# Patient Record
Sex: MALE | Race: WHITE | NOT HISPANIC OR LATINO | Employment: UNEMPLOYED | URBAN - METROPOLITAN AREA
[De-identification: names, ages, dates, MRNs, and addresses within clinical notes are randomized per-mention and may not be internally consistent; named-entity substitution may affect disease eponyms.]

---

## 2021-04-06 ENCOUNTER — LAB (OUTPATIENT)
Dept: LAB | Facility: HOSPITAL | Age: 56
End: 2021-04-06

## 2021-04-06 ENCOUNTER — OFFICE VISIT (OUTPATIENT)
Dept: NEUROLOGY | Facility: CLINIC | Age: 56
End: 2021-04-06

## 2021-04-06 VITALS
WEIGHT: 290 LBS | DIASTOLIC BLOOD PRESSURE: 78 MMHG | SYSTOLIC BLOOD PRESSURE: 142 MMHG | BODY MASS INDEX: 37.22 KG/M2 | HEIGHT: 74 IN | OXYGEN SATURATION: 98 % | HEART RATE: 68 BPM

## 2021-04-06 DIAGNOSIS — G70.00 MYASTHENIA GRAVIS WITHOUT EXACERBATION (HCC): ICD-10-CM

## 2021-04-06 DIAGNOSIS — G70.00 MYASTHENIA GRAVIS WITHOUT EXACERBATION (HCC): Primary | ICD-10-CM

## 2021-04-06 LAB
ALBUMIN SERPL-MCNC: 4.4 G/DL (ref 3.5–5.2)
ALBUMIN/GLOB SERPL: 1.5 G/DL
ALP SERPL-CCNC: 146 U/L (ref 39–117)
ALT SERPL W P-5'-P-CCNC: 45 U/L (ref 1–41)
ANION GAP SERPL CALCULATED.3IONS-SCNC: 10.6 MMOL/L (ref 5–15)
AST SERPL-CCNC: 34 U/L (ref 1–40)
BASOPHILS # BLD AUTO: 0.04 10*3/MM3 (ref 0–0.2)
BASOPHILS NFR BLD AUTO: 0.9 % (ref 0–1.5)
BILIRUB SERPL-MCNC: 0.5 MG/DL (ref 0–1.2)
BUN SERPL-MCNC: 25 MG/DL (ref 6–20)
BUN/CREAT SERPL: 27.2 (ref 7–25)
CALCIUM SPEC-SCNC: 8.8 MG/DL (ref 8.6–10.5)
CHLORIDE SERPL-SCNC: 104 MMOL/L (ref 98–107)
CO2 SERPL-SCNC: 22.4 MMOL/L (ref 22–29)
CREAT SERPL-MCNC: 0.92 MG/DL (ref 0.76–1.27)
DEPRECATED RDW RBC AUTO: 44.6 FL (ref 37–54)
EOSINOPHIL # BLD AUTO: 0.08 10*3/MM3 (ref 0–0.4)
EOSINOPHIL NFR BLD AUTO: 1.8 % (ref 0.3–6.2)
ERYTHROCYTE [DISTWIDTH] IN BLOOD BY AUTOMATED COUNT: 13.5 % (ref 12.3–15.4)
ERYTHROCYTE [SEDIMENTATION RATE] IN BLOOD: 10 MM/HR (ref 0–20)
GFR SERPL CREATININE-BSD FRML MDRD: 85 ML/MIN/1.73
GLOBULIN UR ELPH-MCNC: 3 GM/DL
GLUCOSE SERPL-MCNC: 86 MG/DL (ref 65–99)
HCT VFR BLD AUTO: 42.5 % (ref 37.5–51)
HGB BLD-MCNC: 14.1 G/DL (ref 13–17.7)
IMM GRANULOCYTES # BLD AUTO: 0.01 10*3/MM3 (ref 0–0.05)
IMM GRANULOCYTES NFR BLD AUTO: 0.2 % (ref 0–0.5)
LYMPHOCYTES # BLD AUTO: 1.43 10*3/MM3 (ref 0.7–3.1)
LYMPHOCYTES NFR BLD AUTO: 31.4 % (ref 19.6–45.3)
MCH RBC QN AUTO: 29.9 PG (ref 26.6–33)
MCHC RBC AUTO-ENTMCNC: 33.2 G/DL (ref 31.5–35.7)
MCV RBC AUTO: 90.2 FL (ref 79–97)
MONOCYTES # BLD AUTO: 0.32 10*3/MM3 (ref 0.1–0.9)
MONOCYTES NFR BLD AUTO: 7 % (ref 5–12)
NEUTROPHILS NFR BLD AUTO: 2.68 10*3/MM3 (ref 1.7–7)
NEUTROPHILS NFR BLD AUTO: 58.7 % (ref 42.7–76)
NRBC BLD AUTO-RTO: 0 /100 WBC (ref 0–0.2)
PLATELET # BLD AUTO: 279 10*3/MM3 (ref 140–450)
PMV BLD AUTO: 9.1 FL (ref 6–12)
POTASSIUM SERPL-SCNC: 4.4 MMOL/L (ref 3.5–5.2)
PROT SERPL-MCNC: 7.4 G/DL (ref 6–8.5)
RBC # BLD AUTO: 4.71 10*6/MM3 (ref 4.14–5.8)
SODIUM SERPL-SCNC: 137 MMOL/L (ref 136–145)
TSH SERPL DL<=0.05 MIU/L-ACNC: 2.94 UIU/ML (ref 0.27–4.2)
WBC # BLD AUTO: 4.56 10*3/MM3 (ref 3.4–10.8)

## 2021-04-06 PROCEDURE — 83519 RIA NONANTIBODY: CPT

## 2021-04-06 PROCEDURE — 82607 VITAMIN B-12: CPT

## 2021-04-06 PROCEDURE — 36415 COLL VENOUS BLD VENIPUNCTURE: CPT

## 2021-04-06 PROCEDURE — 80053 COMPREHEN METABOLIC PANEL: CPT

## 2021-04-06 PROCEDURE — 86255 FLUORESCENT ANTIBODY SCREEN: CPT

## 2021-04-06 PROCEDURE — 82746 ASSAY OF FOLIC ACID SERUM: CPT

## 2021-04-06 PROCEDURE — 86038 ANTINUCLEAR ANTIBODIES: CPT

## 2021-04-06 PROCEDURE — 99244 OFF/OP CNSLTJ NEW/EST MOD 40: CPT | Performed by: PSYCHIATRY & NEUROLOGY

## 2021-04-06 PROCEDURE — 85652 RBC SED RATE AUTOMATED: CPT

## 2021-04-06 PROCEDURE — 85025 COMPLETE CBC W/AUTO DIFF WBC: CPT

## 2021-04-06 PROCEDURE — 86431 RHEUMATOID FACTOR QUANT: CPT

## 2021-04-06 PROCEDURE — 84443 ASSAY THYROID STIM HORMONE: CPT

## 2021-04-06 RX ORDER — BUSPIRONE HYDROCHLORIDE 30 MG/1
30 TABLET ORAL 2 TIMES DAILY
COMMUNITY
Start: 2021-01-18

## 2021-04-06 RX ORDER — AZATHIOPRINE 50 MG/1
50 TABLET ORAL 2 TIMES DAILY
Qty: 60 TABLET | Refills: 5 | Status: SHIPPED | OUTPATIENT
Start: 2021-04-06 | End: 2021-05-06 | Stop reason: SDUPTHER

## 2021-04-06 RX ORDER — PAROXETINE HYDROCHLORIDE 40 MG/1
40 TABLET, FILM COATED ORAL DAILY
COMMUNITY
Start: 2021-03-25

## 2021-04-06 RX ORDER — HYOSCYAMINE SULFATE EXTENDED-RELEASE 0.38 MG/1
TABLET ORAL
COMMUNITY
Start: 2021-01-29 | End: 2022-06-30

## 2021-04-06 RX ORDER — PREDNISONE 20 MG/1
40 TABLET ORAL DAILY
Qty: 60 TABLET | Refills: 11 | Status: SHIPPED | OUTPATIENT
Start: 2021-04-06 | End: 2021-11-23

## 2021-04-06 RX ORDER — DICLOFENAC SODIUM 75 MG/1
TABLET, DELAYED RELEASE ORAL
COMMUNITY
End: 2022-09-14 | Stop reason: HOSPADM

## 2021-04-06 RX ORDER — VALACYCLOVIR HYDROCHLORIDE 1 G/1
1000 TABLET, FILM COATED ORAL AS NEEDED
COMMUNITY
Start: 2021-02-03

## 2021-04-06 RX ORDER — PYRIDOSTIGMINE BROMIDE 60 MG/1
TABLET ORAL
COMMUNITY
Start: 2020-11-23 | End: 2021-11-23

## 2021-04-06 RX ORDER — PANTOPRAZOLE SODIUM 40 MG/1
40 TABLET, DELAYED RELEASE ORAL DAILY
COMMUNITY
Start: 2021-03-29 | End: 2022-09-14 | Stop reason: HOSPADM

## 2021-04-06 NOTE — PROGRESS NOTES
"Chief Complaint  MG (NP)    Subjective          Jaiden Burdick presents to [facility] NEUROLOGY for MG.    History of Present Illness    55 y.o. male referred by Dr Boyce.  3 - 4 months ago developed L ptosis, horizontal diplopia.  Worse in the evenings, driving and watching TV.     Denies trouble chewing/swallowing.      Arms are weak.  Trouble arising from a chair.    Heat makes him weak.      MG ADL 11    CT Chest normal     Reviewed medical records:    C/O HA, vision loss, diplopia    Dx with MG by elevated Ach R ab blocking 45, binding     Started on mestinon with GI upset.     MRI Brain, 11/2/2020, mild SVDz  CTA Head, 11/2/2020, mild irregularity of vessels.   Objective   Vital Signs:   /78   Pulse 68   Ht 188 cm (74\")   Wt 132 kg (290 lb)   SpO2 98%   BMI 37.23 kg/m²     Physical Exam  Eyes:      Extraocular Movements: EOM normal.      Pupils: Pupils are equal, round, and reactive to light.   Neurological:      Mental Status: He is oriented to person, place, and time.      Coordination: Finger-Nose-Finger Test, Heel to Shin Test and Romberg Test normal.      Gait: Gait is intact. Tandem walk normal.      Deep Tendon Reflexes: Strength normal.   Psychiatric:         Speech: Speech normal.          Neurologic Exam     Mental Status   Oriented to person, place, and time.   Registration: recalls 3 of 3 objects. Recall at 5 minutes: recalls 3 of 3 objects. Follows 3 step commands.   Attention: normal. Concentration: normal.   Speech: speech is normal   Level of consciousness: alert  Knowledge: good and consistent with education.   Able to name object. Able to read. Able to repeat. Able to write. Normal comprehension.     Cranial Nerves     CN II   Visual fields full to confrontation.   Visual acuity: normal  Right visual field deficit: none  Left visual field deficit: none     CN III, IV, VI   Pupils are equal, round, and reactive to light.  Extraocular motions are normal. "   Nystagmus: none   Diplopia: bilateral  Ophthalmoparesis: none  Upgaze: normal  Downgaze: normal  Conjugate gaze: present  Vestibulo-ocular reflex: present    CN V   Facial sensation intact.   Right corneal reflex: normal  Left corneal reflex: normal    CN VII   Right facial weakness: none  Left facial weakness: none    CN VIII   Hearing: intact    CN IX, X   Palate: symmetric  Right gag reflex: normal  Left gag reflex: normal    CN XI   Right sternocleidomastoid strength: normal  Left sternocleidomastoid strength: normal    CN XII   Tongue: not atrophic  Fasciculations: absent  Tongue deviation: none  Left ptosis        Motor Exam   Muscle bulk: normal  Overall muscle tone: normal  Right arm tone: normal  Left arm tone: normal  Right leg tone: normal  Left leg tone: normal    Strength   Strength 5/5 throughout.     Sensory Exam   Light touch normal.   Pinprick normal.     Gait, Coordination, and Reflexes     Gait  Gait: normal    Coordination   Romberg: negative  Finger to nose coordination: normal  Heel to shin coordination: normal  Tandem walking coordination: normal    Tremor   Resting tremor: absent  Intention tremor: absent  Action tremor: absent    Reflexes   Reflexes 2+ except as noted.          Result Review :   The following data was reviewed by: Franco Garza MD on 04/06/2021:      Data reviewed: Radiologic studies MRI Brain, CTA Head and Consultant notes Dr Boyce          Assessment and Plan    Diagnoses and all orders for this visit:    1. Myasthenia gravis without exacerbation (CMS/HCC) (Primary)  Assessment & Plan:  MG ADL 11    Start Prednisone 40 mg daily and Imuran 50 mg BID    Labs     Orders:  -     Myasthenia Gravis Full Panel; Future  -     Musk Antibody; Future  -     CBC & Differential; Future  -     Comprehensive Metabolic Panel; Future  -     GANESH by IFA, Reflex 9-biomarkers profile; Future  -     Sedimentation Rate; Future  -     Rheumatoid Factor; Future  -     TSH; Future  -      Vitamin B12 & Folate; Future    Other orders  -     predniSONE (DELTASONE) 20 MG tablet; Take 2 tablets by mouth Daily.  Dispense: 60 tablet; Refill: 11  -     azaTHIOprine (IMURAN) 50 MG tablet; Take 1 tablet by mouth 2 (two) times a day.  Dispense: 60 tablet; Refill: 5      Follow Up   No follow-ups on file.  Patient was given instructions and counseling regarding his condition or for health maintenance advice. Please see specific information pulled into the AVS if appropriate.

## 2021-04-07 LAB
CHROMATIN AB SERPL-ACNC: <10 IU/ML (ref 0–14)
FOLATE SERPL-MCNC: 8.54 NG/ML (ref 4.78–24.2)
VIT B12 BLD-MCNC: 528 PG/ML (ref 211–946)

## 2021-04-08 LAB
ANA TITR SER IF: NEGATIVE {TITER}
LABORATORY COMMENT REPORT: NORMAL

## 2021-04-14 LAB — MUSK AB SER-SCNC: <1 U/ML

## 2021-04-15 LAB
ACHR BIND AB SER-SCNC: 7.4 NMOL/L (ref 0–0.24)
ACHR BLOCK AB SER-ACNC: 44 % (ref 0–25)
ACHR MOD AB/ACHR TOTAL SFR SER: 37 % (ref 0–20)
REFLEX INFORMATION: ABNORMAL
STRIA MUS AB TITR SER IF: NEGATIVE {TITER}

## 2021-05-06 ENCOUNTER — LAB (OUTPATIENT)
Dept: LAB | Facility: HOSPITAL | Age: 56
End: 2021-05-06

## 2021-05-06 ENCOUNTER — OFFICE VISIT (OUTPATIENT)
Dept: NEUROLOGY | Facility: CLINIC | Age: 56
End: 2021-05-06

## 2021-05-06 VITALS
TEMPERATURE: 97.5 F | DIASTOLIC BLOOD PRESSURE: 76 MMHG | WEIGHT: 287 LBS | OXYGEN SATURATION: 98 % | BODY MASS INDEX: 36.83 KG/M2 | HEART RATE: 80 BPM | SYSTOLIC BLOOD PRESSURE: 136 MMHG | HEIGHT: 74 IN

## 2021-05-06 DIAGNOSIS — G70.00 MYASTHENIA GRAVIS WITHOUT EXACERBATION (HCC): Primary | Chronic | ICD-10-CM

## 2021-05-06 DIAGNOSIS — G70.00 MYASTHENIA GRAVIS WITHOUT EXACERBATION (HCC): ICD-10-CM

## 2021-05-06 PROCEDURE — 36415 COLL VENOUS BLD VENIPUNCTURE: CPT

## 2021-05-06 PROCEDURE — 80053 COMPREHEN METABOLIC PANEL: CPT

## 2021-05-06 PROCEDURE — 85025 COMPLETE CBC W/AUTO DIFF WBC: CPT

## 2021-05-06 PROCEDURE — 99214 OFFICE O/P EST MOD 30 MIN: CPT | Performed by: PSYCHIATRY & NEUROLOGY

## 2021-05-06 RX ORDER — AZATHIOPRINE 50 MG/1
100 TABLET ORAL 2 TIMES DAILY
Qty: 120 TABLET | Refills: 5 | Status: SHIPPED | OUTPATIENT
Start: 2021-05-06 | End: 2021-07-29 | Stop reason: SDUPTHER

## 2021-05-06 NOTE — PROGRESS NOTES
"Chief Complaint  MG    Subjective          Jaiden Burdick presents to [facility] NEUROLOGY for   History of Present Illness    55 y.o. male returns in follow up.  Last visit on 4/6/21 started prednisone 40 mg daily, AZA, mestinon, ordered labs.     AChR Ab binding 7.4; Blocking 44; modulating 37  MUSK negative    MG ADL 11    Eyelids feel heavy.  Intermittent diplopia.      3 - 4 months ago developed L ptosis, horizontal diplopia.  Worse in the evenings, driving and watching TV.      Mild trouble chewing/swallowing.       Arms are weak.  Trouble arising from a chair.     Feels 40% improvement since starting on medications.          CT Chest normal      Reviewed medical records:     C/O HA, vision loss, diplopia     Dx with MG by elevated Ach R ab blocking 45, binding      Started on mestinon with GI upset.      MRI Brain, 11/2/2020, mild SVDz  CTA Head, 11/2/2020, mild irregularity of vessels.        Objective   Vital Signs:   /76   Pulse 80   Temp 97.5 °F (36.4 °C)   Ht 188 cm (74.02\")   Wt 130 kg (287 lb)   SpO2 98%   BMI 36.83 kg/m²     Physical Exam  Eyes:      Extraocular Movements: EOM normal.      Pupils: Pupils are equal, round, and reactive to light.   Neurological:      Mental Status: He is oriented to person, place, and time.      Coordination: Finger-Nose-Finger Test, Heel to Shin Test and Romberg Test normal.      Gait: Gait is intact. Tandem walk normal.   Psychiatric:         Speech: Speech normal.          Neurologic Exam     Mental Status   Oriented to person, place, and time.   Registration: recalls 3 of 3 objects. Recall at 5 minutes: recalls 3 of 3 objects. Follows 3 step commands.   Attention: normal. Concentration: normal.   Speech: speech is normal   Level of consciousness: alert  Knowledge: good and consistent with education.   Able to name object. Able to read. Able to repeat. Able to write. Normal comprehension.     Cranial Nerves     CN II   Visual fields full " to confrontation.   Visual acuity: normal  Right visual field deficit: none  Left visual field deficit: none     CN III, IV, VI   Pupils are equal, round, and reactive to light.  Extraocular motions are normal.   Nystagmus: none   Diplopia: bilateral  Ophthalmoparesis: none  Upgaze: normal  Downgaze: normal  Conjugate gaze: present  Vestibulo-ocular reflex: present    CN V   Facial sensation intact.   Right corneal reflex: normal  Left corneal reflex: normal    CN VII   Right facial weakness: none  Left facial weakness: none    CN VIII   Hearing: intact    CN IX, X   Palate: symmetric  Right gag reflex: normal  Left gag reflex: normal    CN XI   Right sternocleidomastoid strength: normal  Left sternocleidomastoid strength: normal    CN XII   Tongue: not atrophic  Fasciculations: absent  Tongue deviation: none  Left ptosis        Motor Exam   Muscle bulk: normal  Overall muscle tone: normal  Right arm tone: normal  Left arm tone: normal  Right leg tone: normal  Left leg tone: normal    Strength   Strength 5/5 except as noted.   Right deltoid: 4/5  Left deltoid: 4/5  Right biceps: 4/5  Left biceps: 4/5  Right iliopsoas: 4/5  Left iliopsoas: 4/5  Right quadriceps: 4/5  Left quadriceps: 4/5    Sensory Exam   Light touch normal.   Pinprick normal.     Gait, Coordination, and Reflexes     Gait  Gait: normal    Coordination   Romberg: negative  Finger to nose coordination: normal  Heel to shin coordination: normal  Tandem walking coordination: normal    Tremor   Resting tremor: absent  Intention tremor: absent  Action tremor: absent    Reflexes   Reflexes 2+ except as noted.      Result Review :   The following data was reviewed by: Franco Garza MD on 05/06/2021:  Common labs    Common Labsle 4/6/21 4/6/21    1048 1048   Glucose  86   BUN  25 (A)   Creatinine  0.92   eGFR Non African Am  85   Sodium  137   Potassium  4.4   Chloride  104   Calcium  8.8   Albumin  4.40   Total Bilirubin  0.5   Alkaline Phosphatase  146  (A)   AST (SGOT)  34   ALT (SGPT)  45 (A)   WBC 4.56    Hemoglobin 14.1    Hematocrit 42.5    Platelets 279    (A) Abnormal value                      Assessment and Plan    Diagnoses and all orders for this visit:    1. Myasthenia gravis without exacerbation (CMS/HCC) (Primary)  Assessment & Plan:  Sx improved on prednisone, AZA, mestinon    Increase  mg BID     Orders:  -     CBC & Differential; Future  -     Comprehensive Metabolic Panel; Future    Other orders  -     azaTHIOprine (IMURAN) 50 MG tablet; Take 2 tablets by mouth 2 (two) times a day.  Dispense: 120 tablet; Refill: 5      Follow Up   No follow-ups on file.  Patient was given instructions and counseling regarding his condition or for health maintenance advice. Please see specific information pulled into the AVS if appropriate.

## 2021-05-07 LAB
ALBUMIN SERPL-MCNC: 4.1 G/DL (ref 3.5–5.2)
ALBUMIN/GLOB SERPL: 1.5 G/DL
ALP SERPL-CCNC: 75 U/L (ref 39–117)
ALT SERPL W P-5'-P-CCNC: 28 U/L (ref 1–41)
ANION GAP SERPL CALCULATED.3IONS-SCNC: 12 MMOL/L (ref 5–15)
AST SERPL-CCNC: 22 U/L (ref 1–40)
BASOPHILS # BLD AUTO: 0.03 10*3/MM3 (ref 0–0.2)
BASOPHILS NFR BLD AUTO: 0.3 % (ref 0–1.5)
BILIRUB SERPL-MCNC: 0.7 MG/DL (ref 0–1.2)
BUN SERPL-MCNC: 32 MG/DL (ref 6–20)
BUN/CREAT SERPL: 28.8 (ref 7–25)
CALCIUM SPEC-SCNC: 9.1 MG/DL (ref 8.6–10.5)
CHLORIDE SERPL-SCNC: 104 MMOL/L (ref 98–107)
CO2 SERPL-SCNC: 25 MMOL/L (ref 22–29)
CREAT SERPL-MCNC: 1.11 MG/DL (ref 0.76–1.27)
DEPRECATED RDW RBC AUTO: 47.9 FL (ref 37–54)
EOSINOPHIL # BLD AUTO: 0.03 10*3/MM3 (ref 0–0.4)
EOSINOPHIL NFR BLD AUTO: 0.3 % (ref 0.3–6.2)
ERYTHROCYTE [DISTWIDTH] IN BLOOD BY AUTOMATED COUNT: 14.2 % (ref 12.3–15.4)
GFR SERPL CREATININE-BSD FRML MDRD: 69 ML/MIN/1.73
GLOBULIN UR ELPH-MCNC: 2.8 GM/DL
GLUCOSE SERPL-MCNC: 84 MG/DL (ref 65–99)
HCT VFR BLD AUTO: 41.8 % (ref 37.5–51)
HGB BLD-MCNC: 13.6 G/DL (ref 13–17.7)
IMM GRANULOCYTES # BLD AUTO: 0.04 10*3/MM3 (ref 0–0.05)
IMM GRANULOCYTES NFR BLD AUTO: 0.4 % (ref 0–0.5)
LYMPHOCYTES # BLD AUTO: 2.03 10*3/MM3 (ref 0.7–3.1)
LYMPHOCYTES NFR BLD AUTO: 18.6 % (ref 19.6–45.3)
MCH RBC QN AUTO: 29.5 PG (ref 26.6–33)
MCHC RBC AUTO-ENTMCNC: 32.5 G/DL (ref 31.5–35.7)
MCV RBC AUTO: 90.7 FL (ref 79–97)
MONOCYTES # BLD AUTO: 0.81 10*3/MM3 (ref 0.1–0.9)
MONOCYTES NFR BLD AUTO: 7.4 % (ref 5–12)
NEUTROPHILS NFR BLD AUTO: 7.97 10*3/MM3 (ref 1.7–7)
NEUTROPHILS NFR BLD AUTO: 73 % (ref 42.7–76)
NRBC BLD AUTO-RTO: 0 /100 WBC (ref 0–0.2)
PLATELET # BLD AUTO: 331 10*3/MM3 (ref 140–450)
PMV BLD AUTO: 8.9 FL (ref 6–12)
POTASSIUM SERPL-SCNC: 4.6 MMOL/L (ref 3.5–5.2)
PROT SERPL-MCNC: 6.9 G/DL (ref 6–8.5)
RBC # BLD AUTO: 4.61 10*6/MM3 (ref 4.14–5.8)
SODIUM SERPL-SCNC: 141 MMOL/L (ref 136–145)
WBC # BLD AUTO: 10.91 10*3/MM3 (ref 3.4–10.8)

## 2021-06-03 ENCOUNTER — LAB (OUTPATIENT)
Dept: LAB | Facility: HOSPITAL | Age: 56
End: 2021-06-03

## 2021-06-03 ENCOUNTER — OFFICE VISIT (OUTPATIENT)
Dept: NEUROLOGY | Facility: CLINIC | Age: 56
End: 2021-06-03

## 2021-06-03 VITALS
DIASTOLIC BLOOD PRESSURE: 80 MMHG | SYSTOLIC BLOOD PRESSURE: 140 MMHG | HEIGHT: 74 IN | WEIGHT: 284 LBS | OXYGEN SATURATION: 97 % | BODY MASS INDEX: 36.45 KG/M2 | HEART RATE: 87 BPM

## 2021-06-03 DIAGNOSIS — G70.00 MYASTHENIA GRAVIS WITHOUT EXACERBATION (HCC): ICD-10-CM

## 2021-06-03 DIAGNOSIS — G70.00 MYASTHENIA GRAVIS WITHOUT EXACERBATION (HCC): Primary | Chronic | ICD-10-CM

## 2021-06-03 PROCEDURE — 85025 COMPLETE CBC W/AUTO DIFF WBC: CPT

## 2021-06-03 PROCEDURE — 80053 COMPREHEN METABOLIC PANEL: CPT

## 2021-06-03 PROCEDURE — 99214 OFFICE O/P EST MOD 30 MIN: CPT | Performed by: PSYCHIATRY & NEUROLOGY

## 2021-06-03 PROCEDURE — 36415 COLL VENOUS BLD VENIPUNCTURE: CPT

## 2021-06-03 NOTE — ASSESSMENT & PLAN NOTE
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

## 2021-06-03 NOTE — PROGRESS NOTES
"Chief Complaint  No chief complaint on file.    Subjective          Jaiden Burdick presents to [facility] NEUROLOGY     History of Present Illness    55 y.o. male returns in follow up.  Last visit on 5/6/21 prednisone 40 mg daily, increased AZA, mestinon, ordered labs.      AChR Ab binding 7.4; Blocking 44; modulating 37  MUSK negative     MG ADL 7 improved from 11    No longer on mestinon    Noticing sharp pains throat and abdomen in am not provoked by exertion.         5/6/21 CBC.CMP - NCS     Problem history:    Eyelids feel heavy.  Intermittent diplopia.       3 - 4 months ago developed L ptosis, horizontal diplopia.  Worse in the evenings, driving and watching TV.      Mild trouble chewing/swallowing.       Arms are weak.  Trouble arising from a chair.     Feels 40% improvement since starting on medications.           CT Chest normal      Reviewed medical records:     C/O HA, vision loss, diplopia     Dx with MG by elevated Ach R ab blocking 45, binding      Started on mestinon with GI upset.      MRI Brain, 11/2/2020, mild SVDz  CTA Head, 11/2/2020, mild irregularity of vessels.           Objective   Vital Signs:   /80   Pulse 87   Ht 188 cm (74.02\")   Wt 129 kg (284 lb)   SpO2 97%   BMI 36.45 kg/m²     Physical Exam  Eyes:      Extraocular Movements: EOM normal.      Pupils: Pupils are equal, round, and reactive to light.   Neurological:      Mental Status: He is oriented to person, place, and time.      Coordination: Finger-Nose-Finger Test normal.      Gait: Gait is intact.      Deep Tendon Reflexes: Strength normal.   Psychiatric:         Speech: Speech normal.          Neurologic Exam     Mental Status   Oriented to person, place, and time.   Attention: normal. Concentration: normal.   Speech: speech is normal   Level of consciousness: alert  Knowledge: good and consistent with education.   Normal comprehension.     Cranial Nerves     CN II   Visual fields full to " confrontation.   Visual acuity: normal  Right visual field deficit: none  Left visual field deficit: none     CN III, IV, VI   Pupils are equal, round, and reactive to light.  Extraocular motions are normal.   Nystagmus: none   Diplopia: none  Ophthalmoparesis: none  Upgaze: normal  Downgaze: normal  Conjugate gaze: present    CN V   Facial sensation intact.   Right corneal reflex: normal  Left corneal reflex: normal    CN VII   Right facial weakness: none  Left facial weakness: none    CN VIII   Hearing: intact    CN IX, X   Palate: symmetric  Right gag reflex: normal  Left gag reflex: normal    CN XI   Right sternocleidomastoid strength: normal  Left sternocleidomastoid strength: normal    CN XII   Tongue: not atrophic  Fasciculations: absent  Tongue deviation: none    Motor Exam   Muscle bulk: normal  Overall muscle tone: normal  Right arm tone: normal  Left arm tone: normal  Right leg tone: normal  Left leg tone: normal    Strength   Strength 5/5 throughout.     Sensory Exam   Light touch normal.     Gait, Coordination, and Reflexes     Gait  Gait: normal    Coordination   Finger to nose coordination: normal    Tremor   Resting tremor: absent  Intention tremor: absent  Action tremor: absent    Reflexes   Reflexes 2+ except as noted.      Result Review :   The following data was reviewed by: Franco Garza MD on 06/03/2021:  Common labs    Common Labsle 4/6/21 4/6/21 5/6/21 5/6/21    1048 1048 1600 1600   Glucose  86  84   BUN  25 (A)  32 (A)   Creatinine  0.92  1.11   eGFR Non African Am  85  69   Sodium  137  141   Potassium  4.4  4.6   Chloride  104  104   Calcium  8.8  9.1   Albumin  4.40  4.10   Total Bilirubin  0.5  0.7   Alkaline Phosphatase  146 (A)  75   AST (SGOT)  34  22   ALT (SGPT)  45 (A)  28   WBC 4.56  10.91 (A)    Hemoglobin 14.1  13.6    Hematocrit 42.5  41.8    Platelets 279  331    (A) Abnormal value       Comments are available for some flowsheets but are not being displayed.                       Assessment and Plan    Diagnoses and all orders for this visit:    1. 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  Patient was given instructions and counseling regarding his condition or for health maintenance advice. Please see specific information pulled into the AVS if appropriate.

## 2021-06-04 LAB
ALBUMIN SERPL-MCNC: 4.6 G/DL (ref 3.5–5.2)
ALBUMIN/GLOB SERPL: 1.7 G/DL
ALP SERPL-CCNC: 63 U/L (ref 39–117)
ALT SERPL W P-5'-P-CCNC: 23 U/L (ref 1–41)
ANION GAP SERPL CALCULATED.3IONS-SCNC: 6.3 MMOL/L (ref 5–15)
AST SERPL-CCNC: 17 U/L (ref 1–40)
BASOPHILS # BLD AUTO: 0.02 10*3/MM3 (ref 0–0.2)
BASOPHILS NFR BLD AUTO: 0.3 % (ref 0–1.5)
BILIRUB SERPL-MCNC: 0.5 MG/DL (ref 0–1.2)
BUN SERPL-MCNC: 24 MG/DL (ref 6–20)
BUN/CREAT SERPL: 23.5 (ref 7–25)
CALCIUM SPEC-SCNC: 9.5 MG/DL (ref 8.6–10.5)
CHLORIDE SERPL-SCNC: 102 MMOL/L (ref 98–107)
CO2 SERPL-SCNC: 28.7 MMOL/L (ref 22–29)
CREAT SERPL-MCNC: 1.02 MG/DL (ref 0.76–1.27)
DEPRECATED RDW RBC AUTO: 47.6 FL (ref 37–54)
EOSINOPHIL # BLD AUTO: 0 10*3/MM3 (ref 0–0.4)
EOSINOPHIL NFR BLD AUTO: 0 % (ref 0.3–6.2)
ERYTHROCYTE [DISTWIDTH] IN BLOOD BY AUTOMATED COUNT: 14.1 % (ref 12.3–15.4)
GFR SERPL CREATININE-BSD FRML MDRD: 76 ML/MIN/1.73
GLOBULIN UR ELPH-MCNC: 2.7 GM/DL
GLUCOSE SERPL-MCNC: 113 MG/DL (ref 65–99)
HCT VFR BLD AUTO: 45.6 % (ref 37.5–51)
HGB BLD-MCNC: 15.1 G/DL (ref 13–17.7)
IMM GRANULOCYTES # BLD AUTO: 0.02 10*3/MM3 (ref 0–0.05)
IMM GRANULOCYTES NFR BLD AUTO: 0.3 % (ref 0–0.5)
LYMPHOCYTES # BLD AUTO: 0.78 10*3/MM3 (ref 0.7–3.1)
LYMPHOCYTES NFR BLD AUTO: 10.7 % (ref 19.6–45.3)
MCH RBC QN AUTO: 30.2 PG (ref 26.6–33)
MCHC RBC AUTO-ENTMCNC: 33.1 G/DL (ref 31.5–35.7)
MCV RBC AUTO: 91.2 FL (ref 79–97)
MONOCYTES # BLD AUTO: 0.26 10*3/MM3 (ref 0.1–0.9)
MONOCYTES NFR BLD AUTO: 3.6 % (ref 5–12)
NEUTROPHILS NFR BLD AUTO: 6.18 10*3/MM3 (ref 1.7–7)
NEUTROPHILS NFR BLD AUTO: 85.1 % (ref 42.7–76)
NRBC BLD AUTO-RTO: 0 /100 WBC (ref 0–0.2)
PLATELET # BLD AUTO: 310 10*3/MM3 (ref 140–450)
PMV BLD AUTO: 8.7 FL (ref 6–12)
POTASSIUM SERPL-SCNC: 4.6 MMOL/L (ref 3.5–5.2)
PROT SERPL-MCNC: 7.3 G/DL (ref 6–8.5)
RBC # BLD AUTO: 5 10*6/MM3 (ref 4.14–5.8)
SODIUM SERPL-SCNC: 137 MMOL/L (ref 136–145)
WBC # BLD AUTO: 7.26 10*3/MM3 (ref 3.4–10.8)

## 2021-07-29 RX ORDER — AZATHIOPRINE 50 MG/1
100 TABLET ORAL 2 TIMES DAILY
Qty: 360 TABLET | Refills: 2 | Status: SHIPPED | OUTPATIENT
Start: 2021-07-29 | End: 2022-05-09

## 2021-08-09 ENCOUNTER — TELEPHONE (OUTPATIENT)
Dept: NEUROLOGY | Facility: OTHER | Age: 56
End: 2021-08-09

## 2021-08-09 NOTE — TELEPHONE ENCOUNTER
A lady called to confirm [facility] apt. However there was not verbal auth in chart. I told her she could have him call us to give out details about his appt but it would have to be him that we speak to.

## 2021-09-01 ENCOUNTER — TELEPHONE (OUTPATIENT)
Dept: NEUROLOGY | Facility: CLINIC | Age: 56
End: 2021-09-01

## 2021-09-01 NOTE — TELEPHONE ENCOUNTER
Provider: DR GRIMALDO    Caller: LINDSEY    Relationship to Patient: GIRLFRIEND    Reason for Call: LINDSEY IS LETTING DR GRIMALDO KNOW THAT NAE IS BEING ADMITTED TO THE HOSPITAL FOR COVID AND PNEUMONIA.  [facility]

## 2021-11-23 ENCOUNTER — LAB (OUTPATIENT)
Dept: LAB | Facility: HOSPITAL | Age: 56
End: 2021-11-23

## 2021-11-23 ENCOUNTER — OFFICE VISIT (OUTPATIENT)
Dept: NEUROLOGY | Facility: CLINIC | Age: 56
End: 2021-11-23

## 2021-11-23 VITALS
DIASTOLIC BLOOD PRESSURE: 78 MMHG | TEMPERATURE: 97.1 F | OXYGEN SATURATION: 98 % | SYSTOLIC BLOOD PRESSURE: 134 MMHG | HEIGHT: 74 IN | HEART RATE: 88 BPM | BODY MASS INDEX: 36.45 KG/M2 | WEIGHT: 284 LBS

## 2021-11-23 DIAGNOSIS — K21.9 GASTROESOPHAGEAL REFLUX DISEASE WITHOUT ESOPHAGITIS: ICD-10-CM

## 2021-11-23 DIAGNOSIS — G70.00 MYASTHENIA GRAVIS WITHOUT EXACERBATION (HCC): Primary | Chronic | ICD-10-CM

## 2021-11-23 DIAGNOSIS — G70.00 MYASTHENIA GRAVIS WITHOUT EXACERBATION (HCC): ICD-10-CM

## 2021-11-23 LAB
ALBUMIN SERPL-MCNC: 4 G/DL (ref 3.5–5.2)
ALBUMIN/GLOB SERPL: 1.4 G/DL
ALP SERPL-CCNC: 101 U/L (ref 39–117)
ALT SERPL W P-5'-P-CCNC: 32 U/L (ref 1–41)
ANION GAP SERPL CALCULATED.3IONS-SCNC: 10 MMOL/L (ref 5–15)
AST SERPL-CCNC: 30 U/L (ref 1–40)
BASOPHILS # BLD AUTO: 0.05 10*3/MM3 (ref 0–0.2)
BASOPHILS NFR BLD AUTO: 0.5 % (ref 0–1.5)
BILIRUB SERPL-MCNC: 0.4 MG/DL (ref 0–1.2)
BUN SERPL-MCNC: 28 MG/DL (ref 6–20)
BUN/CREAT SERPL: 23.3 (ref 7–25)
CALCIUM SPEC-SCNC: 9.4 MG/DL (ref 8.6–10.5)
CHLORIDE SERPL-SCNC: 100 MMOL/L (ref 98–107)
CO2 SERPL-SCNC: 27 MMOL/L (ref 22–29)
CREAT SERPL-MCNC: 1.2 MG/DL (ref 0.76–1.27)
DEPRECATED RDW RBC AUTO: 47.9 FL (ref 37–54)
EOSINOPHIL # BLD AUTO: 0.05 10*3/MM3 (ref 0–0.4)
EOSINOPHIL NFR BLD AUTO: 0.5 % (ref 0.3–6.2)
ERYTHROCYTE [DISTWIDTH] IN BLOOD BY AUTOMATED COUNT: 13.9 % (ref 12.3–15.4)
GFR SERPL CREATININE-BSD FRML MDRD: 62 ML/MIN/1.73
GLOBULIN UR ELPH-MCNC: 2.9 GM/DL
GLUCOSE SERPL-MCNC: 93 MG/DL (ref 65–99)
HCT VFR BLD AUTO: 43.6 % (ref 37.5–51)
HGB BLD-MCNC: 14.3 G/DL (ref 13–17.7)
IMM GRANULOCYTES # BLD AUTO: 0.06 10*3/MM3 (ref 0–0.05)
IMM GRANULOCYTES NFR BLD AUTO: 0.7 % (ref 0–0.5)
LYMPHOCYTES # BLD AUTO: 2.49 10*3/MM3 (ref 0.7–3.1)
LYMPHOCYTES NFR BLD AUTO: 27.2 % (ref 19.6–45.3)
MCH RBC QN AUTO: 30.9 PG (ref 26.6–33)
MCHC RBC AUTO-ENTMCNC: 32.8 G/DL (ref 31.5–35.7)
MCV RBC AUTO: 94.2 FL (ref 79–97)
MONOCYTES # BLD AUTO: 0.72 10*3/MM3 (ref 0.1–0.9)
MONOCYTES NFR BLD AUTO: 7.9 % (ref 5–12)
NEUTROPHILS NFR BLD AUTO: 5.77 10*3/MM3 (ref 1.7–7)
NEUTROPHILS NFR BLD AUTO: 63.2 % (ref 42.7–76)
NRBC BLD AUTO-RTO: 0 /100 WBC (ref 0–0.2)
PLATELET # BLD AUTO: 346 10*3/MM3 (ref 140–450)
PMV BLD AUTO: 8.4 FL (ref 6–12)
POTASSIUM SERPL-SCNC: 4.4 MMOL/L (ref 3.5–5.2)
PROT SERPL-MCNC: 6.9 G/DL (ref 6–8.5)
RBC # BLD AUTO: 4.63 10*6/MM3 (ref 4.14–5.8)
SODIUM SERPL-SCNC: 137 MMOL/L (ref 136–145)
WBC NRBC COR # BLD: 9.14 10*3/MM3 (ref 3.4–10.8)

## 2021-11-23 PROCEDURE — 36415 COLL VENOUS BLD VENIPUNCTURE: CPT

## 2021-11-23 PROCEDURE — 85025 COMPLETE CBC W/AUTO DIFF WBC: CPT

## 2021-11-23 PROCEDURE — 99214 OFFICE O/P EST MOD 30 MIN: CPT | Performed by: PSYCHIATRY & NEUROLOGY

## 2021-11-23 PROCEDURE — 80053 COMPREHEN METABOLIC PANEL: CPT

## 2021-11-23 RX ORDER — PREDNISONE 20 MG/1
20 TABLET ORAL DAILY
Qty: 60 TABLET | Refills: 11 | Status: SHIPPED | OUTPATIENT
Start: 2021-11-23 | End: 2022-04-05

## 2021-11-23 RX ORDER — ALBUTEROL SULFATE 90 UG/1
AEROSOL, METERED RESPIRATORY (INHALATION)
COMMUNITY
Start: 2021-10-03 | End: 2022-08-15

## 2021-12-14 ENCOUNTER — TELEPHONE (OUTPATIENT)
Dept: NEUROLOGY | Facility: CLINIC | Age: 56
End: 2021-12-14

## 2021-12-14 NOTE — TELEPHONE ENCOUNTER
Caller: LINDSEY ADAN    Relationship: Emergency Contact; FRIEND    Best call back number: [phone]    What form or medical record are you requesting: LAST OV NOTE 11/23/21 & LAB RESULTS    Who is requesting this form or medical record from you: NEW PCP OFFICE, DR. LINDSEY RODRIGUEZ    How would you like to receive the form or medical records (pick-up, mail, fax): FAX  If fax, what is the fax number: [phone]    Timeframe paperwork needed: EARLIEST CONVENIENCE    Additional notes: PT'S FRIEND CALLED ON HIS BEHALF TO NOTE THAT PT IS SWITCHING PCP'S TO SOMEONE CLOSER TO HIS PLACE OF WORK. NEW PCP IS DR. RODRIGUEZ WHICH I HAVE UPDATED IN PT'S BH CHART. PT'S FRIEND STATES THAT THE NEW PCP OFFICE HAS REQUESTED COPY OF OV NOTE & LABS BE SENT TO THEIR FAX TO REVIEW AT HIS APPT TOMORROW.    PLEASE REVIEW AND ADVISE.

## 2021-12-16 ENCOUNTER — TELEPHONE (OUTPATIENT)
Dept: NEUROLOGY | Facility: CLINIC | Age: 56
End: 2021-12-16

## 2021-12-16 NOTE — TELEPHONE ENCOUNTER
Caller: LINDSEY ADAN    Relationship: Emergency Contact    Best call back number: [phone]    What medications are you currently taking:   Current Outpatient Medications on File Prior to Visit   Medication Sig Dispense Refill   • albuterol sulfate  (90 Base) MCG/ACT inhaler      • azaTHIOprine (IMURAN) 50 MG tablet Take 2 tablets by mouth 2 (two) times a day. 360 tablet 2   • Breo Ellipta 200-25 MCG/INH inhaler      • busPIRone (BUSPAR) 30 MG tablet Take 30 mg by mouth 2 (Two) Times a Day.     • cimetidine (CIMETIDINE) 200 MG tablet Take 200 mg by mouth 4 (Four) Times a Day.     • diclofenac (VOLTAREN) 75 MG EC tablet diclofenac sodium 75 mg tablet,delayed release     • hyoscyamine (Levbid) 0.375 MG 12 hr tablet Twice A Day     • pantoprazole (PROTONIX) 40 MG EC tablet Take 40 mg by mouth Daily.     • PARoxetine (PAXIL) 40 MG tablet Take 40 mg by mouth Daily.     • predniSONE (DELTASONE) 20 MG tablet Take 1 tablet by mouth Daily. 60 tablet 11   • valACYclovir (VALTREX) 1000 MG tablet Take 1,000 mg by mouth 3 (Three) Times a Day.       No current facility-administered medications on file prior to visit.        Which medication are you concerned about: BREO ELLIPTA INHALER AND ALBUTEROL INHALER    Who prescribed you this medication: DR. PATTON    What are your concerns: PT'S EC IS CALLING TO SEE IF DR. GRIMALDO WOULD BE WILLING TO REFILL THESE INHALERS AND SEND TO THE [facility] ON FILE.  PT'S PREVIOUS PCP-DR. PATTON IS IN [place] FOR THE HOLIDAYS AND PT DOES NOT SEE HIS NEW PCP UNTIL 01/14/22.  PT WAS IN HOSPITAL FOR A WEEK FOR COVID AND IS STILL HAVING DIFFICULTY WITH BREATHING AND WHEEZING.  PT'S EC REPORTS THAT THEY CANNOT AFFORD TO GO BACK TO THE EMERGENCY ROOM.

## 2021-12-16 NOTE — TELEPHONE ENCOUNTER
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 I recommend she reach out to his office for refills. If wheezing worsens she can also get an appointment with her PCP or urgent treatment rather than ED if needed.

## 2021-12-16 NOTE — TELEPHONE ENCOUNTER
Patient informed to contact PCP office and ask who ever it is covering him should be able to refill. Patient and Bjorn both verbalized all understanding.   -TMT

## 2022-04-05 ENCOUNTER — OFFICE VISIT (OUTPATIENT)
Dept: NEUROLOGY | Facility: CLINIC | Age: 57
End: 2022-04-05

## 2022-04-05 ENCOUNTER — LAB (OUTPATIENT)
Dept: LAB | Facility: HOSPITAL | Age: 57
End: 2022-04-05

## 2022-04-05 VITALS
HEIGHT: 74 IN | DIASTOLIC BLOOD PRESSURE: 78 MMHG | HEART RATE: 100 BPM | SYSTOLIC BLOOD PRESSURE: 138 MMHG | WEIGHT: 284 LBS | OXYGEN SATURATION: 96 % | TEMPERATURE: 96.2 F | BODY MASS INDEX: 36.45 KG/M2

## 2022-04-05 DIAGNOSIS — G70.00 MYASTHENIA GRAVIS WITHOUT EXACERBATION: Primary | Chronic | ICD-10-CM

## 2022-04-05 DIAGNOSIS — G70.00 MYASTHENIA GRAVIS WITHOUT EXACERBATION: ICD-10-CM

## 2022-04-05 LAB
ALBUMIN SERPL-MCNC: 4.8 G/DL (ref 3.5–5.2)
ALBUMIN/GLOB SERPL: 1.7 G/DL
ALP SERPL-CCNC: 91 U/L (ref 39–117)
ALT SERPL W P-5'-P-CCNC: 25 U/L (ref 1–41)
ANION GAP SERPL CALCULATED.3IONS-SCNC: 14 MMOL/L (ref 5–15)
AST SERPL-CCNC: 29 U/L (ref 1–40)
BASOPHILS # BLD AUTO: 0.04 10*3/MM3 (ref 0–0.2)
BASOPHILS NFR BLD AUTO: 0.3 % (ref 0–1.5)
BILIRUB SERPL-MCNC: 0.5 MG/DL (ref 0–1.2)
BUN SERPL-MCNC: 26 MG/DL (ref 6–20)
BUN/CREAT SERPL: 26.3 (ref 7–25)
CALCIUM SPEC-SCNC: 9.7 MG/DL (ref 8.6–10.5)
CHLORIDE SERPL-SCNC: 101 MMOL/L (ref 98–107)
CO2 SERPL-SCNC: 25 MMOL/L (ref 22–29)
CREAT SERPL-MCNC: 0.99 MG/DL (ref 0.76–1.27)
DEPRECATED RDW RBC AUTO: 47.8 FL (ref 37–54)
EGFRCR SERPLBLD CKD-EPI 2021: 88.9 ML/MIN/1.73
EOSINOPHIL # BLD AUTO: 0.02 10*3/MM3 (ref 0–0.4)
EOSINOPHIL NFR BLD AUTO: 0.1 % (ref 0.3–6.2)
ERYTHROCYTE [DISTWIDTH] IN BLOOD BY AUTOMATED COUNT: 13.8 % (ref 12.3–15.4)
GLOBULIN UR ELPH-MCNC: 2.9 GM/DL
GLUCOSE SERPL-MCNC: 130 MG/DL (ref 65–99)
HCT VFR BLD AUTO: 47.4 % (ref 37.5–51)
HGB BLD-MCNC: 15.7 G/DL (ref 13–17.7)
IMM GRANULOCYTES # BLD AUTO: 0.06 10*3/MM3 (ref 0–0.05)
IMM GRANULOCYTES NFR BLD AUTO: 0.4 % (ref 0–0.5)
LYMPHOCYTES # BLD AUTO: 0.3 10*3/MM3 (ref 0.7–3.1)
LYMPHOCYTES NFR BLD AUTO: 2.2 % (ref 19.6–45.3)
MCH RBC QN AUTO: 31.1 PG (ref 26.6–33)
MCHC RBC AUTO-ENTMCNC: 33.1 G/DL (ref 31.5–35.7)
MCV RBC AUTO: 93.9 FL (ref 79–97)
MONOCYTES # BLD AUTO: 0.63 10*3/MM3 (ref 0.1–0.9)
MONOCYTES NFR BLD AUTO: 4.6 % (ref 5–12)
NEUTROPHILS NFR BLD AUTO: 12.66 10*3/MM3 (ref 1.7–7)
NEUTROPHILS NFR BLD AUTO: 92.4 % (ref 42.7–76)
NRBC BLD AUTO-RTO: 0 /100 WBC (ref 0–0.2)
PLATELET # BLD AUTO: 369 10*3/MM3 (ref 140–450)
PMV BLD AUTO: 8.8 FL (ref 6–12)
POTASSIUM SERPL-SCNC: 4.9 MMOL/L (ref 3.5–5.2)
PROT SERPL-MCNC: 7.7 G/DL (ref 6–8.5)
RBC # BLD AUTO: 5.05 10*6/MM3 (ref 4.14–5.8)
SODIUM SERPL-SCNC: 140 MMOL/L (ref 136–145)
WBC NRBC COR # BLD: 13.71 10*3/MM3 (ref 3.4–10.8)

## 2022-04-05 PROCEDURE — 99214 OFFICE O/P EST MOD 30 MIN: CPT | Performed by: PSYCHIATRY & NEUROLOGY

## 2022-04-05 PROCEDURE — 36415 COLL VENOUS BLD VENIPUNCTURE: CPT

## 2022-04-05 PROCEDURE — 85025 COMPLETE CBC W/AUTO DIFF WBC: CPT

## 2022-04-05 PROCEDURE — 80053 COMPREHEN METABOLIC PANEL: CPT

## 2022-04-05 RX ORDER — DIPHENHYDRAMINE HYDROCHLORIDE 50 MG/ML
25 INJECTION INTRAMUSCULAR; INTRAVENOUS ONCE
Status: CANCELLED | OUTPATIENT
Start: 2022-04-05

## 2022-04-05 RX ORDER — PREDNISONE 10 MG/1
30 TABLET ORAL DAILY
COMMUNITY
Start: 2022-03-08 | End: 2022-08-15

## 2022-04-05 RX ORDER — ACETAMINOPHEN 325 MG/1
650 TABLET ORAL EVERY 4 HOURS PRN
Status: CANCELLED | OUTPATIENT
Start: 2022-04-05

## 2022-04-05 RX ORDER — METHYLPREDNISOLONE SODIUM SUCCINATE 125 MG/2ML
125 INJECTION, POWDER, LYOPHILIZED, FOR SOLUTION INTRAMUSCULAR; INTRAVENOUS ONCE
Status: CANCELLED | OUTPATIENT
Start: 2022-04-05

## 2022-04-05 RX ORDER — IBUPROFEN 400 MG/1
400 TABLET ORAL EVERY 6 HOURS PRN
Status: CANCELLED | OUTPATIENT
Start: 2022-04-05

## 2022-04-05 RX ORDER — DIPHENHYDRAMINE HCL 25 MG
25 CAPSULE ORAL ONCE
Status: CANCELLED | OUTPATIENT
Start: 2022-04-05

## 2022-04-05 RX ORDER — HYDROXYZINE PAMOATE 25 MG/1
25 CAPSULE ORAL ONCE
Status: CANCELLED | OUTPATIENT
Start: 2022-04-05

## 2022-04-05 RX ORDER — ALBUTEROL SULFATE 90 UG/1
2 AEROSOL, METERED RESPIRATORY (INHALATION) ONCE
Status: CANCELLED | OUTPATIENT
Start: 2022-04-05

## 2022-04-05 RX ORDER — FAMOTIDINE 10 MG/ML
20 INJECTION, SOLUTION INTRAVENOUS ONCE
Status: CANCELLED | OUTPATIENT
Start: 2022-04-05

## 2022-04-05 NOTE — ASSESSMENT & PLAN NOTE
MG ADL 8    Continue prednisone 20 mg daily  Imuran 100 mg BID    Mestinon     Vyvgart therapy plan

## 2022-04-05 NOTE — PROGRESS NOTES
"Chief Complaint    Myasthenia Gravis    SOB      Subjective          Jaiden Burdick presents to [facility] NEUROLOGY     History of Present Illness    56 y.o. male returns in follow up.  Last visit on 11/23/21 continued prednisone 20 mg daily, AZA, mestinon, ordered labs.      AChR Ab binding 7.4; Blocking 44; modulating 37  MUSK negative     Admitted 9/1/21 - 9/8/21 Covid PNA at [place].  Denies worsening of MG Sx.       MG ADL 8     Regaining strength.        5/6/21 CBC.CMP - NCS      Problem history:     Eyelids feel heavy.  Intermittent diplopia.       3 - 4 months ago developed L ptosis, horizontal diplopia.  Worse in the evenings, driving and watching TV.      Mild trouble chewing/swallowing.       Arms are weak.  Trouble arising from a chair.     Feels 40% improvement since starting on medications.        CT Chest normal      Reviewed medical records:     C/O HA, vision loss, diplopia     Dx with MG by elevated Ach R ab blocking 45, binding      Started on mestinon with GI upset.      MRI Brain, 11/2/2020, mild SVDz  CTA Head, 11/2/2020, mild irregularity of vessels.           Objective   Vital Signs:   /78   Pulse 100   Temp 96.2 °F (35.7 °C)   Ht 188 cm (74.02\")   Wt 129 kg (284 lb)   SpO2 96%   BMI 36.45 kg/m²            Physical Exam  Eyes:      Extraocular Movements: EOM normal.      Pupils: Pupils are equal, round, and reactive to light.   Neurological:      Mental Status: He is oriented to person, place, and time.      Gait: Gait is intact.   Psychiatric:         Speech: Speech normal.          Neurologic Exam     Mental Status   Oriented to person, place, and time.   Speech: speech is normal   Level of consciousness: alert  Knowledge: good and consistent with education.   Normal comprehension.     Cranial Nerves   Cranial nerves II through XII intact.     CN II   Visual fields full to confrontation.   Visual acuity: normal  Right visual field deficit: none  Left visual " field deficit: none     CN III, IV, VI   Pupils are equal, round, and reactive to light.  Extraocular motions are normal.   Nystagmus: none   Diplopia: none  Ophthalmoparesis: none  Upgaze: normal  Downgaze: normal  Conjugate gaze: present    CN V   Facial sensation intact.   Right corneal reflex: normal  Left corneal reflex: normal    CN VII   Right facial weakness: none  Left facial weakness: none    CN VIII   Hearing: intact    CN IX, X   Palate: symmetric  Right gag reflex: normal  Left gag reflex: normal    CN XI   Right sternocleidomastoid strength: normal  Left sternocleidomastoid strength: normal    CN XII   Tongue: not atrophic  Fasciculations: absent  Tongue deviation: none    Motor Exam   Muscle bulk: normal  Overall muscle tone: normal    Strength   Strength 5/5 except as noted.   Right iliopsoas: 4/5  Left iliopsoas: 4/5  Right quadriceps: 4/5  Left quadriceps: 4/5  Right hamstrin/5  Left hamstrin/5  Right glutei: 4/5  Left glutei: 4/5  Right anterior tibial: 4/5  Left anterior tibial: 4/5  Right posterior tibial: 4/5  Left posterior tibial: 4/5  Right peroneal: 4/5  Left peroneal: 4/5  Right gastroc: 4/5  Left gastroc: 4/5    Sensory Exam   Light touch normal.     Gait, Coordination, and Reflexes     Gait  Gait: normal    Tremor   Resting tremor: absent  Intention tremor: absent  Action tremor: absent    Reflexes   Reflexes 2+ except as noted.      Result Review :   The following data was reviewed by: Franco Garza MD on 2022:  Common labs    Common Labsle 5/6/21 5/6/21 6/3/21 6/3/21 11/23/21 11/23/21    1600 1600 1604 1604 1509 1509   Glucose  84  113 (A) 93    BUN  32 (A)  24 (A) 28 (A)    Creatinine  1.11  1.02 1.20    eGFR Non African Am  69  76 62    Sodium  141  137 137    Potassium  4.6  4.6 4.4    Chloride  104  102 100    Calcium  9.1  9.5 9.4    Albumin  4.10  4.60 4.00    Total Bilirubin  0.7  0.5 0.4    Alkaline Phosphatase  75  63 101    AST (SGOT)  22  17 30    ALT  (SGPT)  28  23 32    WBC 10.91 (A)  7.26   9.14   Hemoglobin 13.6  15.1   14.3   Hematocrit 41.8  45.6   43.6   Platelets 331  310   346   (A) Abnormal value       Comments are available for some flowsheets but are not being displayed.                     Assessment and Plan    Diagnoses and all orders for this visit:    1. Myasthenia gravis without exacerbation (HCC) (Primary)  Assessment & Plan:  MG ADL 8    Continue prednisone 20 mg daily  Imuran 100 mg BID    Mestinon     Vyvgart therapy plan     Orders:  -     CBC & Differential; Future  -     Comprehensive Metabolic Panel; Future      Follow Up   No follow-ups on file.  Patient was given instructions and counseling regarding his condition or for health maintenance advice. Please see specific information pulled into the AVS if appropriate.

## 2022-04-11 ENCOUNTER — TELEPHONE (OUTPATIENT)
Dept: NEUROLOGY | Facility: CLINIC | Age: 57
End: 2022-04-11

## 2022-04-11 RX ORDER — PREDNISONE 10 MG/1
TABLET ORAL
Qty: 120 TABLET | Refills: 11 | OUTPATIENT
Start: 2022-04-11

## 2022-04-11 NOTE — TELEPHONE ENCOUNTER
Provider: DR. GRIMALDO    Caller: DELBERT    Phone Number: [phone]    Reason for Call: DELBERT IS CALLING TO STATE THAT THE [facility] IS INCOMPLETE AND THEY CANNOT PROCEED.  SAID IT'S OKAY TO ATTACH THE INSURANCE INFO, BUT NONE OF THE PROVIDER INFO HAS BEEN COMPLETED.  THEY NEED THIS CORRECTED AND RESENT.

## 2022-04-11 NOTE — TELEPHONE ENCOUNTER
Rx Refill Note  Requested Prescriptions     Pending Prescriptions Disp Refills   • predniSONE (DELTASONE) 10 MG tablet [Pharmacy Med Name: PREDNISONE 10 MG TABLET] 120 tablet 11     Sig: TAKE 4 TABLETS BY MOUTH EVERY DAY      Last office visit with prescribing clinician: 4/5/2022      Next office visit with prescribing clinician: 6/17/2022            Yancy Aguero MA  04/11/22, 09:02 EDT   Last filled: 11/23/21 with 11 refills  Too soon to fill

## 2022-04-19 ENCOUNTER — INFUSION (OUTPATIENT)
Dept: ONCOLOGY | Facility: HOSPITAL | Age: 57
End: 2022-04-19

## 2022-04-19 VITALS
BODY MASS INDEX: 37.6 KG/M2 | TEMPERATURE: 97.7 F | WEIGHT: 293 LBS | DIASTOLIC BLOOD PRESSURE: 89 MMHG | RESPIRATION RATE: 18 BRPM | SYSTOLIC BLOOD PRESSURE: 153 MMHG | HEART RATE: 70 BPM

## 2022-04-19 DIAGNOSIS — G70.00 MYASTHENIA GRAVIS WITHOUT EXACERBATION: Primary | ICD-10-CM

## 2022-04-19 PROCEDURE — 25010000002 EFGARTIGIMOD ALFA-FCAB 400 MG/20ML SOLUTION 20 ML VIAL: Performed by: PSYCHIATRY & NEUROLOGY

## 2022-04-19 PROCEDURE — 96365 THER/PROPH/DIAG IV INF INIT: CPT

## 2022-04-19 PROCEDURE — C9399 UNCLASSIFIED DRUGS OR BIOLOG: HCPCS | Performed by: PSYCHIATRY & NEUROLOGY

## 2022-04-19 RX ORDER — ACETAMINOPHEN 325 MG/1
650 TABLET ORAL EVERY 4 HOURS PRN
Status: CANCELLED | OUTPATIENT
Start: 2022-04-26

## 2022-04-19 RX ORDER — ALBUTEROL SULFATE 90 UG/1
2 AEROSOL, METERED RESPIRATORY (INHALATION) ONCE
Status: CANCELLED | OUTPATIENT
Start: 2022-04-26

## 2022-04-19 RX ORDER — DIPHENHYDRAMINE HCL 25 MG
25 CAPSULE ORAL ONCE
Status: CANCELLED | OUTPATIENT
Start: 2022-04-26

## 2022-04-19 RX ORDER — METHYLPREDNISOLONE SODIUM SUCCINATE 125 MG/2ML
125 INJECTION, POWDER, LYOPHILIZED, FOR SOLUTION INTRAMUSCULAR; INTRAVENOUS ONCE
Status: CANCELLED | OUTPATIENT
Start: 2022-04-26

## 2022-04-19 RX ORDER — DIPHENHYDRAMINE HYDROCHLORIDE 50 MG/ML
25 INJECTION INTRAMUSCULAR; INTRAVENOUS ONCE
Status: CANCELLED | OUTPATIENT
Start: 2022-04-26

## 2022-04-19 RX ORDER — IBUPROFEN 400 MG/1
400 TABLET ORAL EVERY 6 HOURS PRN
Status: CANCELLED | OUTPATIENT
Start: 2022-04-26

## 2022-04-19 RX ORDER — HYDROXYZINE PAMOATE 25 MG/1
25 CAPSULE ORAL ONCE
Status: CANCELLED | OUTPATIENT
Start: 2022-04-26

## 2022-04-19 RX ORDER — FAMOTIDINE 10 MG/ML
20 INJECTION, SOLUTION INTRAVENOUS ONCE
Status: CANCELLED | OUTPATIENT
Start: 2022-04-26

## 2022-04-19 RX ADMIN — EFGARTIGIMOD ALFA 1200 MG: 20 INJECTION INTRAVENOUS at 09:10

## 2022-04-26 ENCOUNTER — INFUSION (OUTPATIENT)
Dept: ONCOLOGY | Facility: HOSPITAL | Age: 57
End: 2022-04-26

## 2022-04-26 VITALS
WEIGHT: 291 LBS | HEART RATE: 67 BPM | DIASTOLIC BLOOD PRESSURE: 91 MMHG | BODY MASS INDEX: 37.35 KG/M2 | TEMPERATURE: 97.3 F | SYSTOLIC BLOOD PRESSURE: 164 MMHG | RESPIRATION RATE: 18 BRPM

## 2022-04-26 DIAGNOSIS — G70.00 MYASTHENIA GRAVIS WITHOUT EXACERBATION: Primary | ICD-10-CM

## 2022-04-26 PROCEDURE — 25010000002 EFGARTIGIMOD ALFA-FCAB 400 MG/20ML SOLUTION 20 ML VIAL: Performed by: PSYCHIATRY & NEUROLOGY

## 2022-04-26 PROCEDURE — 96365 THER/PROPH/DIAG IV INF INIT: CPT

## 2022-04-26 PROCEDURE — C9399 UNCLASSIFIED DRUGS OR BIOLOG: HCPCS | Performed by: PSYCHIATRY & NEUROLOGY

## 2022-04-26 RX ORDER — DIPHENHYDRAMINE HYDROCHLORIDE 50 MG/ML
25 INJECTION INTRAMUSCULAR; INTRAVENOUS ONCE
Status: CANCELLED | OUTPATIENT
Start: 2022-05-03

## 2022-04-26 RX ORDER — METHYLPREDNISOLONE SODIUM SUCCINATE 125 MG/2ML
125 INJECTION, POWDER, LYOPHILIZED, FOR SOLUTION INTRAMUSCULAR; INTRAVENOUS ONCE
Status: CANCELLED | OUTPATIENT
Start: 2022-05-03

## 2022-04-26 RX ORDER — HYDROXYZINE PAMOATE 25 MG/1
25 CAPSULE ORAL ONCE
Status: CANCELLED | OUTPATIENT
Start: 2022-05-03

## 2022-04-26 RX ORDER — DIPHENHYDRAMINE HCL 25 MG
25 CAPSULE ORAL ONCE
Status: CANCELLED | OUTPATIENT
Start: 2022-05-03

## 2022-04-26 RX ORDER — ALBUTEROL SULFATE 90 UG/1
2 AEROSOL, METERED RESPIRATORY (INHALATION) ONCE
Status: CANCELLED | OUTPATIENT
Start: 2022-05-03

## 2022-04-26 RX ORDER — ACETAMINOPHEN 325 MG/1
650 TABLET ORAL EVERY 4 HOURS PRN
Status: CANCELLED | OUTPATIENT
Start: 2022-05-03

## 2022-04-26 RX ORDER — FAMOTIDINE 10 MG/ML
20 INJECTION, SOLUTION INTRAVENOUS ONCE
Status: CANCELLED | OUTPATIENT
Start: 2022-05-03

## 2022-04-26 RX ORDER — IBUPROFEN 400 MG/1
400 TABLET ORAL EVERY 6 HOURS PRN
Status: CANCELLED | OUTPATIENT
Start: 2022-05-03

## 2022-04-26 RX ADMIN — EFGARTIGIMOD ALFA 1200 MG: 20 INJECTION INTRAVENOUS at 10:16

## 2022-05-03 ENCOUNTER — INFUSION (OUTPATIENT)
Dept: ONCOLOGY | Facility: HOSPITAL | Age: 57
End: 2022-05-03

## 2022-05-03 VITALS
BODY MASS INDEX: 37.22 KG/M2 | SYSTOLIC BLOOD PRESSURE: 148 MMHG | TEMPERATURE: 96.1 F | DIASTOLIC BLOOD PRESSURE: 78 MMHG | WEIGHT: 290 LBS | HEART RATE: 108 BPM | RESPIRATION RATE: 18 BRPM

## 2022-05-03 DIAGNOSIS — G70.00 MYASTHENIA GRAVIS WITHOUT EXACERBATION: Primary | ICD-10-CM

## 2022-05-03 PROCEDURE — C9399 UNCLASSIFIED DRUGS OR BIOLOG: HCPCS | Performed by: PSYCHIATRY & NEUROLOGY

## 2022-05-03 PROCEDURE — 25010000002 EFGARTIGIMOD ALFA-FCAB 400 MG/20ML SOLUTION 20 ML VIAL: Performed by: PSYCHIATRY & NEUROLOGY

## 2022-05-03 PROCEDURE — 96365 THER/PROPH/DIAG IV INF INIT: CPT

## 2022-05-03 RX ORDER — METHYLPREDNISOLONE SODIUM SUCCINATE 125 MG/2ML
125 INJECTION, POWDER, LYOPHILIZED, FOR SOLUTION INTRAMUSCULAR; INTRAVENOUS ONCE
Status: CANCELLED | OUTPATIENT
Start: 2022-05-10

## 2022-05-03 RX ORDER — IBUPROFEN 400 MG/1
400 TABLET ORAL EVERY 6 HOURS PRN
Status: CANCELLED | OUTPATIENT
Start: 2022-05-10

## 2022-05-03 RX ORDER — HYDROXYZINE PAMOATE 25 MG/1
25 CAPSULE ORAL ONCE
Status: CANCELLED | OUTPATIENT
Start: 2022-05-10

## 2022-05-03 RX ORDER — ACETAMINOPHEN 325 MG/1
650 TABLET ORAL EVERY 4 HOURS PRN
Status: CANCELLED | OUTPATIENT
Start: 2022-05-10

## 2022-05-03 RX ORDER — DIPHENHYDRAMINE HYDROCHLORIDE 50 MG/ML
25 INJECTION INTRAMUSCULAR; INTRAVENOUS ONCE
Status: CANCELLED | OUTPATIENT
Start: 2022-05-10

## 2022-05-03 RX ORDER — FAMOTIDINE 10 MG/ML
20 INJECTION, SOLUTION INTRAVENOUS ONCE
Status: CANCELLED | OUTPATIENT
Start: 2022-05-10

## 2022-05-03 RX ORDER — DIPHENHYDRAMINE HCL 25 MG
25 CAPSULE ORAL ONCE
Status: CANCELLED | OUTPATIENT
Start: 2022-05-10

## 2022-05-03 RX ORDER — ALBUTEROL SULFATE 90 UG/1
2 AEROSOL, METERED RESPIRATORY (INHALATION) ONCE
Status: CANCELLED | OUTPATIENT
Start: 2022-05-10

## 2022-05-03 RX ADMIN — EFGARTIGIMOD ALFA 1200 MG: 20 INJECTION INTRAVENOUS at 11:06

## 2022-05-09 RX ORDER — AZATHIOPRINE 50 MG/1
TABLET ORAL
Qty: 360 TABLET | Refills: 2 | Status: SHIPPED | OUTPATIENT
Start: 2022-05-09 | End: 2023-01-23 | Stop reason: SDUPTHER

## 2022-05-09 NOTE — TELEPHONE ENCOUNTER
Rx Refill Note  Requested Prescriptions     Pending Prescriptions Disp Refills   • azaTHIOprine (IMURAN) 50 MG tablet [Pharmacy Med Name: AZATHIOPRINE 50 MG TABLET] 360 tablet 2     Sig: TAKE 2 TABLETS BY MOUTH TWICE A DAY      Last filled: 7/29/2021 90 days with 2 refills  Sent this in.   Last office visit with prescribing clinician: 4/5/2022      Next office visit with prescribing clinician: 6/17/2022     Yancy Aguero MA  05/09/22, 09:59 EDT

## 2022-05-10 ENCOUNTER — INFUSION (OUTPATIENT)
Dept: ONCOLOGY | Facility: HOSPITAL | Age: 57
End: 2022-05-10

## 2022-05-10 VITALS
WEIGHT: 291.4 LBS | RESPIRATION RATE: 18 BRPM | DIASTOLIC BLOOD PRESSURE: 82 MMHG | TEMPERATURE: 98.1 F | BODY MASS INDEX: 37.4 KG/M2 | HEART RATE: 86 BPM | SYSTOLIC BLOOD PRESSURE: 147 MMHG

## 2022-05-10 DIAGNOSIS — G70.00 MYASTHENIA GRAVIS WITHOUT EXACERBATION: Primary | ICD-10-CM

## 2022-05-10 PROCEDURE — 25010000002 EFGARTIGIMOD ALFA-FCAB 400 MG/20ML SOLUTION 20 ML VIAL: Performed by: PSYCHIATRY & NEUROLOGY

## 2022-05-10 PROCEDURE — C9399 UNCLASSIFIED DRUGS OR BIOLOG: HCPCS | Performed by: PSYCHIATRY & NEUROLOGY

## 2022-05-10 PROCEDURE — 96365 THER/PROPH/DIAG IV INF INIT: CPT

## 2022-05-10 RX ORDER — METHYLPREDNISOLONE SODIUM SUCCINATE 125 MG/2ML
125 INJECTION, POWDER, LYOPHILIZED, FOR SOLUTION INTRAMUSCULAR; INTRAVENOUS ONCE
Status: CANCELLED | OUTPATIENT
Start: 2022-05-17

## 2022-05-10 RX ORDER — ALBUTEROL SULFATE 90 UG/1
2 AEROSOL, METERED RESPIRATORY (INHALATION) ONCE
Status: CANCELLED | OUTPATIENT
Start: 2022-05-17

## 2022-05-10 RX ORDER — DIPHENHYDRAMINE HYDROCHLORIDE 50 MG/ML
25 INJECTION INTRAMUSCULAR; INTRAVENOUS ONCE
Status: CANCELLED | OUTPATIENT
Start: 2022-05-17

## 2022-05-10 RX ORDER — FAMOTIDINE 10 MG/ML
20 INJECTION, SOLUTION INTRAVENOUS ONCE
Status: CANCELLED | OUTPATIENT
Start: 2022-05-17

## 2022-05-10 RX ORDER — IBUPROFEN 400 MG/1
400 TABLET ORAL EVERY 6 HOURS PRN
Status: CANCELLED | OUTPATIENT
Start: 2022-05-17

## 2022-05-10 RX ORDER — ACETAMINOPHEN 325 MG/1
650 TABLET ORAL EVERY 4 HOURS PRN
Status: CANCELLED | OUTPATIENT
Start: 2022-05-17

## 2022-05-10 RX ORDER — HYDROXYZINE PAMOATE 25 MG/1
25 CAPSULE ORAL ONCE
Status: CANCELLED | OUTPATIENT
Start: 2022-05-17

## 2022-05-10 RX ORDER — DIPHENHYDRAMINE HCL 25 MG
25 CAPSULE ORAL ONCE
Status: CANCELLED | OUTPATIENT
Start: 2022-05-17

## 2022-05-10 RX ADMIN — EFGARTIGIMOD ALFA 1200 MG: 20 INJECTION INTRAVENOUS at 13:40

## 2022-06-28 ENCOUNTER — TELEPHONE (OUTPATIENT)
Dept: NEUROLOGY | Facility: CLINIC | Age: 57
End: 2022-06-28

## 2022-06-28 NOTE — TELEPHONE ENCOUNTER
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 ON 6/22/22 FOR WEAKNESS/SHAKINESS/INABILITY TO BREATHE.    O2 CHECKED OUT FINE.    PATIENT IS NOW EXPERIENCING THIS AGAIN THIS MORNING.    PATIENT HAD STENT PUT IN ON 6/9/22.    ASPEN (NURSE) DOES NOT FEEL PATIENT NEEDS TO GO BACK TO THE E.R.; IS SCHEDULED TO BE SEEN BY CARDIOLOGIST @ 11:30 A.M. TODAY.    PLEASE CALL & ADVISE.    THANK YOU.

## 2022-06-28 NOTE — TELEPHONE ENCOUNTER
Spoke with Bjorn. Advised to have patient increase prednisone to 30mg daily and scheduled repeat Vyvgart infusion. Asked if we could get patient scheduled for an earlier follow up because they do not feel the infusion is working and patient is just feeling miserable. Was able to get patient scheduled for 6/30/2022 with Dr. Garza per patient & fiance request. Tried to transfer to infusion to get patient scheduled but they were checking in a patient & will give them a call back to get scheduled. Bjorn verbalized understanding.

## 2022-06-30 ENCOUNTER — OFFICE VISIT (OUTPATIENT)
Dept: NEUROLOGY | Facility: CLINIC | Age: 57
End: 2022-06-30

## 2022-06-30 ENCOUNTER — LAB (OUTPATIENT)
Dept: LAB | Facility: HOSPITAL | Age: 57
End: 2022-06-30

## 2022-06-30 ENCOUNTER — INFUSION (OUTPATIENT)
Dept: ONCOLOGY | Facility: HOSPITAL | Age: 57
End: 2022-06-30

## 2022-06-30 VITALS
DIASTOLIC BLOOD PRESSURE: 72 MMHG | TEMPERATURE: 96.8 F | OXYGEN SATURATION: 99 % | SYSTOLIC BLOOD PRESSURE: 118 MMHG | BODY MASS INDEX: 37.35 KG/M2 | WEIGHT: 291 LBS | HEART RATE: 80 BPM | HEIGHT: 74 IN

## 2022-06-30 DIAGNOSIS — G70.00 MYASTHENIA GRAVIS WITHOUT EXACERBATION: ICD-10-CM

## 2022-06-30 DIAGNOSIS — G70.00 MYASTHENIA GRAVIS WITHOUT EXACERBATION: Primary | ICD-10-CM

## 2022-06-30 DIAGNOSIS — G70.00 MYASTHENIA GRAVIS WITHOUT EXACERBATION: Primary | Chronic | ICD-10-CM

## 2022-06-30 LAB
ALBUMIN SERPL-MCNC: 4.1 G/DL (ref 3.5–5.2)
ALBUMIN/GLOB SERPL: 2 G/DL
ALP SERPL-CCNC: 106 U/L (ref 39–117)
ALT SERPL W P-5'-P-CCNC: 32 U/L (ref 1–41)
ANION GAP SERPL CALCULATED.3IONS-SCNC: 9.9 MMOL/L (ref 5–15)
AST SERPL-CCNC: 37 U/L (ref 1–40)
BASOPHILS # BLD AUTO: 0.02 10*3/MM3 (ref 0–0.2)
BASOPHILS NFR BLD AUTO: 0.2 % (ref 0–1.5)
BILIRUB SERPL-MCNC: 0.5 MG/DL (ref 0–1.2)
BUN SERPL-MCNC: 27 MG/DL (ref 6–20)
BUN/CREAT SERPL: 25.7 (ref 7–25)
CALCIUM SPEC-SCNC: 9.6 MG/DL (ref 8.6–10.5)
CHLORIDE SERPL-SCNC: 108 MMOL/L (ref 98–107)
CO2 SERPL-SCNC: 23.1 MMOL/L (ref 22–29)
CREAT SERPL-MCNC: 1.05 MG/DL (ref 0.76–1.27)
DEPRECATED RDW RBC AUTO: 48.8 FL (ref 37–54)
EGFRCR SERPLBLD CKD-EPI 2021: 82.8 ML/MIN/1.73
EOSINOPHIL # BLD AUTO: 0 10*3/MM3 (ref 0–0.4)
EOSINOPHIL NFR BLD AUTO: 0 % (ref 0.3–6.2)
ERYTHROCYTE [DISTWIDTH] IN BLOOD BY AUTOMATED COUNT: 14.6 % (ref 12.3–15.4)
ERYTHROCYTE [SEDIMENTATION RATE] IN BLOOD: 3 MM/HR (ref 0–20)
FOLATE SERPL-MCNC: 6.01 NG/ML (ref 4.78–24.2)
GLOBULIN UR ELPH-MCNC: 2.1 GM/DL
GLUCOSE SERPL-MCNC: 108 MG/DL (ref 65–99)
HCT VFR BLD AUTO: 23.8 % (ref 37.5–51)
HGB BLD-MCNC: 7.8 G/DL (ref 13–17.7)
IMM GRANULOCYTES # BLD AUTO: 0.12 10*3/MM3 (ref 0–0.05)
IMM GRANULOCYTES NFR BLD AUTO: 0.9 % (ref 0–0.5)
LYMPHOCYTES # BLD AUTO: 0.44 10*3/MM3 (ref 0.7–3.1)
LYMPHOCYTES NFR BLD AUTO: 3.3 % (ref 19.6–45.3)
MCH RBC QN AUTO: 30.8 PG (ref 26.6–33)
MCHC RBC AUTO-ENTMCNC: 32.8 G/DL (ref 31.5–35.7)
MCV RBC AUTO: 94.1 FL (ref 79–97)
MONOCYTES # BLD AUTO: 0.72 10*3/MM3 (ref 0.1–0.9)
MONOCYTES NFR BLD AUTO: 5.5 % (ref 5–12)
NEUTROPHILS NFR BLD AUTO: 11.89 10*3/MM3 (ref 1.7–7)
NEUTROPHILS NFR BLD AUTO: 90.1 % (ref 42.7–76)
NRBC BLD AUTO-RTO: 0.2 /100 WBC (ref 0–0.2)
PLATELET # BLD AUTO: 429 10*3/MM3 (ref 140–450)
PMV BLD AUTO: 8.8 FL (ref 6–12)
POTASSIUM SERPL-SCNC: 4.5 MMOL/L (ref 3.5–5.2)
PROT SERPL-MCNC: 6.2 G/DL (ref 6–8.5)
RBC # BLD AUTO: 2.53 10*6/MM3 (ref 4.14–5.8)
SODIUM SERPL-SCNC: 141 MMOL/L (ref 136–145)
TSH SERPL DL<=0.05 MIU/L-ACNC: 2.63 UIU/ML (ref 0.27–4.2)
VIT B12 BLD-MCNC: 343 PG/ML (ref 211–946)
WBC NRBC COR # BLD: 13.19 10*3/MM3 (ref 3.4–10.8)

## 2022-06-30 PROCEDURE — 96372 THER/PROPH/DIAG INJ SC/IM: CPT

## 2022-06-30 PROCEDURE — 90472 IMMUNIZATION ADMIN EACH ADD: CPT

## 2022-06-30 PROCEDURE — 90471 IMMUNIZATION ADMIN: CPT

## 2022-06-30 PROCEDURE — 25010000002 MENINGOCOCCAL B SUSPENSION PREFILLED SYRINGE: Performed by: PSYCHIATRY & NEUROLOGY

## 2022-06-30 PROCEDURE — 36415 COLL VENOUS BLD VENIPUNCTURE: CPT

## 2022-06-30 PROCEDURE — 25010000002 MENINGOCOCCAL RECONSTITUTED SOLUTION: Performed by: PSYCHIATRY & NEUROLOGY

## 2022-06-30 PROCEDURE — 80050 GENERAL HEALTH PANEL: CPT | Performed by: PSYCHIATRY & NEUROLOGY

## 2022-06-30 PROCEDURE — 85652 RBC SED RATE AUTOMATED: CPT | Performed by: PSYCHIATRY & NEUROLOGY

## 2022-06-30 PROCEDURE — 82607 VITAMIN B-12: CPT | Performed by: PSYCHIATRY & NEUROLOGY

## 2022-06-30 PROCEDURE — 90734 MENACWYD/MENACWYCRM VACC IM: CPT | Performed by: PSYCHIATRY & NEUROLOGY

## 2022-06-30 PROCEDURE — 90620 MENB-4C VACCINE IM: CPT | Performed by: PSYCHIATRY & NEUROLOGY

## 2022-06-30 PROCEDURE — 82746 ASSAY OF FOLIC ACID SERUM: CPT | Performed by: PSYCHIATRY & NEUROLOGY

## 2022-06-30 PROCEDURE — 99214 OFFICE O/P EST MOD 30 MIN: CPT | Performed by: PSYCHIATRY & NEUROLOGY

## 2022-06-30 RX ORDER — SODIUM CHLORIDE 9 MG/ML
250 INJECTION, SOLUTION INTRAVENOUS ONCE
Status: CANCELLED | OUTPATIENT
Start: 2022-06-30

## 2022-06-30 RX ADMIN — Medication 0.5 ML: at 11:55

## 2022-06-30 RX ADMIN — NEISSERIA MENINGITIDIS SEROGROUP B NHBA FUSION PROTEIN ANTIGEN, NEISSERIA MENINGITIDIS SEROGROUP B FHBP FUSION PROTEIN ANTIGEN AND NEISSERIA MENINGITIDIS SEROGROUP B NADA PROTEIN ANTIGEN 0.5 ML: 50; 50; 50; 25 INJECTION, SUSPENSION INTRAMUSCULAR at 11:53

## 2022-06-30 NOTE — PROGRESS NOTES
"Chief Complaint  MG    Subjective        Jaiden Burdick presents to [facility] NEUROLOGY     History of Present Illness    56 y.o. male returns in follow up.  Last visit on 4/5/22  continued prednisone 20 mg daily, AZA, mestinon, ordered labs.      AChR Ab binding 7.4; Blocking 44; modulating 37  MUSK negative     Admitted 9/1/21 - 9/8/21 Covid PNA at [facility].  Denies worsening of MG Sx.       One stent placed in LAD three weeks ago.  Felt better for a few days.      Denies diplopia,    MG ADL 10    [place] llike he went downhill after Vyvagrt.      Increased prednisone 30 mg daily    End of last week could not get out of bed due to SOB.  Exertion would cause SOB and HR increase 150.        Problem history:     Eyelids feel heavy.  Intermittent diplopia.       3 - 4 months ago developed L ptosis, horizontal diplopia.  Worse in the evenings, driving and watching TV.      Mild trouble chewing/swallowing.       Arms are weak.  Trouble arising from a chair.     Feels 40% improvement since starting on medications.        CT Chest normal      Reviewed medical records:     C/O HA, vision loss, diplopia     Dx with MG by elevated Ach R ab blocking 45, binding      Started on mestinon with GI upset.      MRI Brain, 11/2/2020, mild SVDz  CTA Head, 11/2/2020, mild irregularity of vessels.          Objective   Vital Signs:  /72   Pulse 80   Temp 96.8 °F (36 °C)   Ht 188 cm (74\")   Wt 132 kg (291 lb)   SpO2 99%   BMI 37.36 kg/m²   Estimated body mass index is 37.36 kg/m² as calculated from the following:    Height as of this encounter: 188 cm (74\").    Weight as of this encounter: 132 kg (291 lb).          Physical Exam  Eyes:      Extraocular Movements: EOM normal.      Pupils: Pupils are equal, round, and reactive to light.   Neurological:      Mental Status: He is oriented to person, place, and time.      Gait: Gait is intact.      Deep Tendon Reflexes: Strength normal.   Psychiatric:         " Speech: Speech normal.          Neurologic Exam     Mental Status   Oriented to person, place, and time.   Speech: speech is normal   Level of consciousness: alert  Knowledge: good and consistent with education.   Normal comprehension.     Cranial Nerves   Cranial nerves II through XII intact.     CN II   Visual fields full to confrontation.   Visual acuity: normal  Right visual field deficit: none  Left visual field deficit: none     CN III, IV, VI   Pupils are equal, round, and reactive to light.  Extraocular motions are normal.   Nystagmus: none   Diplopia: none  Ophthalmoparesis: none  Upgaze: normal  Downgaze: normal  Conjugate gaze: present    CN V   Facial sensation intact.   Right corneal reflex: normal  Left corneal reflex: normal    CN VII   Right facial weakness: none  Left facial weakness: none    CN VIII   Hearing: intact    CN IX, X   Palate: symmetric  Right gag reflex: normal  Left gag reflex: normal    CN XI   Right sternocleidomastoid strength: normal  Left sternocleidomastoid strength: normal    CN XII   Tongue: not atrophic  Fasciculations: absent  Tongue deviation: none    Motor Exam   Muscle bulk: normal  Overall muscle tone: normal    Strength   Strength 5/5 throughout.     Sensory Exam   Light touch normal.     Gait, Coordination, and Reflexes     Gait  Gait: normal    Tremor   Resting tremor: absent  Intention tremor: absent  Action tremor: absent    Reflexes   Reflexes 2+ except as noted.      Result Review :    Common labs    Common Labsle 11/23/21 11/23/21 4/5/22 4/5/22    1509 1509 1356 1356   Glucose 93  130 (A)    BUN 28 (A)  26 (A)    Creatinine 1.20  0.99    eGFR Non African Am 62      Sodium 137  140    Potassium 4.4  4.9    Chloride 100  101    Calcium 9.4  9.7    Albumin 4.00  4.80    Total Bilirubin 0.4  0.5    Alkaline Phosphatase 101  91    AST (SGOT) 30  29    ALT (SGPT) 32  25    WBC  9.14  13.71 (A)   Hemoglobin  14.3  15.7   Hematocrit  43.6  47.4   Platelets  346  369    (A) Abnormal value          The following data was reviewed by: Franco Garza MD on 06/30/2022:            Assessment and Plan   Diagnoses and all orders for this visit:    1. Myasthenia gravis without exacerbation (HCC) (Primary)  Assessment & Plan:  MG ADL 10     Start Ultomaris    Vaccinate for meningococcus     Continue prednisone, AZA     Orders:  -     CBC & Differential; Future  -     Comprehensive Metabolic Panel; Future  -     TSH; Future  -     Vitamin B12 & Folate; Future  -     Sedimentation Rate; Future           Follow Up   No follow-ups on file.  Patient was given instructions and counseling regarding his condition or for health maintenance advice. Please see specific information pulled into the AVS if appropriate.

## 2022-07-19 ENCOUNTER — TELEPHONE (OUTPATIENT)
Dept: NEUROLOGY | Facility: CLINIC | Age: 57
End: 2022-07-19

## 2022-07-19 NOTE — TELEPHONE ENCOUNTER
Provider: DILLAN  Caller: DEJA BELL  Relationship to Patient: N/A  Pharmacy: N/A  Phone Number: [phone]  Reason for Call: REP FOR [facility] TELEPHONED; WOULD LIKE A COPY OF THE DENIAL LETTER SO HE CAN IDENTIFY REASON OF DENIAL & INVESTIGATE TO PROVIDE ADDL RESOURCES TO ASSIST PATIENT.    PLEASE CALL.    THANK YOU.

## 2022-08-05 ENCOUNTER — TELEPHONE (OUTPATIENT)
Dept: NEUROLOGY | Facility: CLINIC | Age: 57
End: 2022-08-05

## 2022-08-05 NOTE — TELEPHONE ENCOUNTER
Called insurance to set up peer to peer. Provided patient name, , medication calling for and that I need them to call me back ASAP. Provided my direct number.

## 2022-08-09 ENCOUNTER — TELEPHONE (OUTPATIENT)
Dept: NEUROLOGY | Facility: CLINIC | Age: 57
End: 2022-08-09

## 2022-08-09 NOTE — TELEPHONE ENCOUNTER
Spoke with Bjorn to clarify about insurance. Informed I spoke with Clara and they said his insurance termed 8/1/2022. Stated Jaiden lost his job and is working on getting [employer].

## 2022-08-15 ENCOUNTER — OFFICE VISIT (OUTPATIENT)
Dept: NEUROLOGY | Facility: CLINIC | Age: 57
End: 2022-08-15

## 2022-08-15 VITALS
HEART RATE: 75 BPM | BODY MASS INDEX: 36.7 KG/M2 | HEIGHT: 74 IN | OXYGEN SATURATION: 96 % | SYSTOLIC BLOOD PRESSURE: 134 MMHG | DIASTOLIC BLOOD PRESSURE: 72 MMHG | WEIGHT: 286 LBS

## 2022-08-15 DIAGNOSIS — G70.00 MYASTHENIA GRAVIS WITHOUT EXACERBATION: Primary | Chronic | ICD-10-CM

## 2022-08-15 PROCEDURE — 99214 OFFICE O/P EST MOD 30 MIN: CPT | Performed by: PSYCHIATRY & NEUROLOGY

## 2022-08-15 RX ORDER — SODIUM CHLORIDE 9 MG/ML
250 INJECTION, SOLUTION INTRAVENOUS ONCE
OUTPATIENT
Start: 2022-08-15

## 2022-08-15 RX ORDER — ASPIRIN 81 MG/1
TABLET ORAL
COMMUNITY
Start: 2022-05-26

## 2022-08-15 RX ORDER — AMLODIPINE BESYLATE 5 MG/1
5 TABLET ORAL DAILY
COMMUNITY
Start: 2022-06-23

## 2022-08-15 RX ORDER — PRASUGREL 10 MG/1
10 TABLET, FILM COATED ORAL DAILY
COMMUNITY
Start: 2022-06-18 | End: 2022-09-14 | Stop reason: HOSPADM

## 2022-08-15 RX ORDER — PREDNISONE 20 MG/1
20 TABLET ORAL DAILY
COMMUNITY
Start: 2022-08-04 | End: 2022-11-21 | Stop reason: SDUPTHER

## 2022-08-15 RX ORDER — BISOPROLOL FUMARATE 5 MG/1
5 TABLET, FILM COATED ORAL DAILY
COMMUNITY
Start: 2022-06-28

## 2022-08-15 RX ORDER — ATORVASTATIN CALCIUM 80 MG/1
80 TABLET, FILM COATED ORAL
COMMUNITY
Start: 2022-06-18 | End: 2022-09-14 | Stop reason: HOSPADM

## 2022-08-15 NOTE — PROGRESS NOTES
"Chief Complaint  myasthenia gravis    Subjective        Jaiden Burdick presents to [facility] NEUROLOGY     History of Present Illness    57 y.o. male returns in follow up.  Last visit on 6/30/22 rx Ultomaris, vaccinated for meningococcus, continue prednisone, AZA, ordered labs.     AChR Ab binding 7.4; Blocking 44; modulating 37  MUSK negative     Admitted 9/1/21 - 9/8/21 Covid PNA at [facility].  Denies worsening of MG Sx.       One stent placed in LAD three weeks ago.  Felt better for a few days.       Denies diplopia,     MG ADL 11     [place] llike he went downhill after Vyvagrt.       Prednisone 30 mg daily          Problem history:     Eyelids feel heavy.  Intermittent diplopia.       3 - 4 months ago developed L ptosis, horizontal diplopia.  Worse in the evenings, driving and watching TV.      Mild trouble chewing/swallowing.       Arms are weak.  Trouble arising from a chair.     Feels 40% improvement since starting on medications.        CT Chest normal      Reviewed medical records:     C/O HA, vision loss, diplopia     Dx with MG by elevated Ach R ab blocking 45, binding      Started on mestinon with GI upset.      MRI Brain, 11/2/2020, mild SVDz  CTA Head, 11/2/2020, mild irregularity of vessels.      Objective   Vital Signs:  /72   Pulse 75   Ht 188 cm (74.02\")   Wt 130 kg (286 lb)   SpO2 96%   BMI 36.70 kg/m²   Estimated body mass index is 36.7 kg/m² as calculated from the following:    Height as of this encounter: 188 cm (74.02\").    Weight as of this encounter: 130 kg (286 lb).          Physical Exam  Eyes:      Extraocular Movements: EOM normal.      Pupils: Pupils are equal, round, and reactive to light.   Neurological:      Mental Status: He is oriented to person, place, and time.      Gait: Gait is intact.   Psychiatric:         Speech: Speech normal.          Neurologic Exam     Mental Status   Oriented to person, place, and time.   Speech: speech is normal   Level of " consciousness: alert  Knowledge: good and consistent with education.   Normal comprehension.     Cranial Nerves   Cranial nerves II through XII intact.     CN II   Visual fields full to confrontation.   Visual acuity: normal  Right visual field deficit: none  Left visual field deficit: none     CN III, IV, VI   Pupils are equal, round, and reactive to light.  Extraocular motions are normal.   Nystagmus: none   Diplopia: none  Ophthalmoparesis: none  Upgaze: normal  Downgaze: normal  Conjugate gaze: present    CN V   Facial sensation intact.   Right corneal reflex: normal  Left corneal reflex: normal    CN VII   Right facial weakness: none  Left facial weakness: none    CN VIII   Hearing: intact    CN IX, X   Palate: symmetric  Right gag reflex: normal  Left gag reflex: normal    CN XI   Right sternocleidomastoid strength: normal  Left sternocleidomastoid strength: normal    CN XII   Tongue: not atrophic  Fasciculations: absent  Tongue deviation: none    Motor Exam   Muscle bulk: normal  Overall muscle tone: normal    Strength   Strength 5/5 except as noted.   Right deltoid: 4/5  Left deltoid: 4/5  Right biceps: 4/5  Left biceps: 4/5  Right triceps: 4/5  Left triceps: 4/5  Right iliopsoas: 4/5  Left iliopsoas: 4/5  Right quadriceps: 4/5  Left quadriceps: 4/5    Sensory Exam   Light touch normal.     Gait, Coordination, and Reflexes     Gait  Gait: normal    Tremor   Resting tremor: absent  Intention tremor: absent  Action tremor: absent    Reflexes   Reflexes 2+ except as noted.      Result Review :  The following data was reviewed by: Franco Garza MD on 08/15/2022:  Common labs    Common Labsle 11/23/21 11/23/21 4/5/22 4/5/22 6/30/22 6/30/22    1509 1509 1356 1356 1131 1131   Glucose 93  130 (A)   108 (A)   BUN 28 (A)  26 (A)   27 (A)   Creatinine 1.20  0.99   1.05   eGFR Non African Am 62        Sodium 137  140   141   Potassium 4.4  4.9   4.5   Chloride 100  101   108 (A)   Calcium 9.4  9.7   9.6   Albumin  4.00  4.80   4.10   Total Bilirubin 0.4  0.5   0.5   Alkaline Phosphatase 101  91   106   AST (SGOT) 30  29   37   ALT (SGPT) 32  25   32   WBC  9.14  13.71 (A) 13.19 (A)    Hemoglobin  14.3  15.7 7.8 (A)    Hematocrit  43.6  47.4 23.8 (A)    Platelets  346  369 429    (A) Abnormal value                      Assessment and Plan   Diagnoses and all orders for this visit:    1. Myasthenia gravis without exacerbation (HCC) (Primary)  Assessment & Plan:  Attempt PA for Ultomaris     Finished meningococcus vaccines    Continue AZA, Prednisone              Follow Up   No follow-ups on file.  Patient was given instructions and counseling regarding his condition or for health maintenance advice. Please see specific information pulled into the AVS if appropriate.

## 2022-09-09 ENCOUNTER — TELEPHONE (OUTPATIENT)
Dept: NEUROLOGY | Facility: CLINIC | Age: 57
End: 2022-09-09

## 2022-09-09 NOTE — TELEPHONE ENCOUNTER
Called Bjorn to let her know that Dr. Garza wants him to go into the ED. She stated that every time they go into [facility] ED they monitor him and then send him home. Advised her maybe they should come up to [facility] to see they will do things differently. Also, asked her to send us his insurance cards as soon as possible so that we can get to work on authorizing his infusion. She verbalized understanding.

## 2022-09-09 NOTE — TELEPHONE ENCOUNTER
Provider: NIKA GRIMALDO MD    Caller: LINDSEY    Relationship to Patient: LAURIE    Phone Number: [phone]    Reason for Call: STATES PT IS HAVING ANOTHER EPISODE.   PTS LEGS ARE WEEK, PT UNABLE TO MAKE IT TO THE BATHROOM WITHOUT HELP.  PT IS VERY WEEK. STATED PT COLLAPSED GOING TO TRUCK YESTERDAY (9-8-22).  STATES PT IS SHORT OF BREATH, EXTREME FATIGUE, PALE.  STATES PTS OXYGEN IS 98%.  DID NOT TAKE PT TO THE ER.  STATES IT IS LIKE NO MUSCLE AT ALL WITH HIS LEGS.  ADVISED TO TAKE PT TO ER D/T SOB.  STATED EACH TIME THEY GO THEY SEND PT HOME.  CALLED S/W SHANA AT CLINIC D/T PROTOCOL.  ADVISED TO TRANSFER CALL AND SHE WILL GET PT TO THE NURSE.

## 2022-09-09 NOTE — TELEPHONE ENCOUNTER
Bjorn, Spouse called stating PFT, chest x ray, been to ED several times had stents etc. Legs are giving out from under him.legs are weak can't even get up and go to the bathroom. Negative for COVID or any type of infection.  Yesterday son had to get him into the truck. O2 level is 98, 99 so that is fine but he can't get his breath. BP is fine as well     Tried to do IV therapy again but insurance would not cover it.    Asked what type of Insurance he has/for updated card info. She states he is currently on [place] [employer] now. She does not have the card on her right now to give.    Feels like he may be having an MG exasperation

## 2022-09-09 NOTE — TELEPHONE ENCOUNTER
DELETE AFTER REVIEWING: Telephone encounter to be sent to the clinical pool     Caller: LINDSEY ADAN    Relationship to patient: Emergency Contact    Best call back number: [phone]    Chief complaint: WEAK LEGS, SHORTNESS OF BREATH, PALE, EXTREME FATIGUE    Patient directed to call 911 or go to their nearest emergency room. YES    Patient verbalized understanding: [] Yes  [x] No  If no, why? STATES THEY ALWAYS JUST SENT PT HOME     Additional notes: TRANSFERRED CALL TO SHANA AT CLINIC.   I DO NOT KNOW THE OUTCOME.

## 2022-09-12 ENCOUNTER — OFFICE VISIT (OUTPATIENT)
Dept: NEUROLOGY | Facility: CLINIC | Age: 57
End: 2022-09-12

## 2022-09-12 ENCOUNTER — LAB (OUTPATIENT)
Dept: LAB | Facility: HOSPITAL | Age: 57
End: 2022-09-12

## 2022-09-12 VITALS
HEART RATE: 86 BPM | WEIGHT: 286 LBS | OXYGEN SATURATION: 98 % | HEIGHT: 74 IN | SYSTOLIC BLOOD PRESSURE: 130 MMHG | BODY MASS INDEX: 36.7 KG/M2 | DIASTOLIC BLOOD PRESSURE: 62 MMHG | TEMPERATURE: 96.9 F

## 2022-09-12 DIAGNOSIS — G70.00 MYASTHENIA GRAVIS WITHOUT EXACERBATION: Primary | Chronic | ICD-10-CM

## 2022-09-12 DIAGNOSIS — G70.00 MYASTHENIA GRAVIS WITHOUT EXACERBATION: ICD-10-CM

## 2022-09-12 PROCEDURE — 99214 OFFICE O/P EST MOD 30 MIN: CPT | Performed by: PSYCHIATRY & NEUROLOGY

## 2022-09-12 PROCEDURE — 80050 GENERAL HEALTH PANEL: CPT

## 2022-09-12 PROCEDURE — 36415 COLL VENOUS BLD VENIPUNCTURE: CPT

## 2022-09-12 NOTE — PROGRESS NOTES
"Chief Complaint    Myasthenia Gravis    Subjective        Jaiden Burdick presents to [facility] NEUROLOGY     History of Present Illness    57 y.o. male returns in follow up.  Last visit on 8/15/22 continued AZA, prednisone.      AChR Ab binding 7.4; Blocking 44; modulating 37  MUSK negative     Admitted 9/1/21 - 9/8/21 Covid PNA at [facility].  Denies worsening of MG Sx.       One stent placed in LAD three weeks ago.  Felt better for a few days.       LE feel weak and is LH and dizzy when standing.     Stools have been black for a week.  Sx of weakness in last week.       MG ADL 11     [place] llike he went downhill after Vyvagrt.       Prednisone 30 mg daily        Objective   Vital Signs:  /62   Pulse 86   Temp 96.9 °F (36.1 °C)   Ht 188 cm (74.02\")   Wt 130 kg (286 lb)   SpO2 98%   BMI 36.70 kg/m²   Estimated body mass index is 36.7 kg/m² as calculated from the following:    Height as of this encounter: 188 cm (74.02\").    Weight as of this encounter: 130 kg (286 lb).          Physical Exam  Eyes:      Extraocular Movements: EOM normal.      Pupils: Pupils are equal, round, and reactive to light.   Neurological:      Mental Status: He is oriented to person, place, and time.      Gait: Gait is intact.      Deep Tendon Reflexes: Strength normal.   Psychiatric:         Speech: Speech normal.          Neurologic Exam     Mental Status   Oriented to person, place, and time.   Speech: speech is normal   Level of consciousness: alert  Knowledge: good and consistent with education.   Normal comprehension.     Cranial Nerves   Cranial nerves II through XII intact.     CN II   Visual fields full to confrontation.   Visual acuity: normal  Right visual field deficit: none  Left visual field deficit: none     CN III, IV, VI   Pupils are equal, round, and reactive to light.  Extraocular motions are normal.   Nystagmus: none   Diplopia: none  Ophthalmoparesis: none  Upgaze: normal  Downgaze: " normal  Conjugate gaze: present    CN V   Facial sensation intact.   Right corneal reflex: normal  Left corneal reflex: normal    CN VII   Right facial weakness: none  Left facial weakness: none    CN VIII   Hearing: intact    CN IX, X   Palate: symmetric  Right gag reflex: normal  Left gag reflex: normal    CN XI   Right sternocleidomastoid strength: normal  Left sternocleidomastoid strength: normal    CN XII   Tongue: not atrophic  Fasciculations: absent  Tongue deviation: none    Motor Exam   Muscle bulk: normal  Overall muscle tone: normal    Strength   Strength 5/5 throughout.     Sensory Exam   Light touch normal.     Gait, Coordination, and Reflexes     Gait  Gait: normal    Tremor   Resting tremor: absent  Intention tremor: absent  Action tremor: absent    Reflexes   Reflexes 2+ except as noted.      Result Review :  The following data was reviewed by: Franco Garza MD on 09/12/2022:  Common labs    Common Labsle 11/23/21 11/23/21 4/5/22 4/5/22 6/30/22 6/30/22    1509 1509 1356 1356 1131 1131   Glucose 93  130 (A)   108 (A)   BUN 28 (A)  26 (A)   27 (A)   Creatinine 1.20  0.99   1.05   eGFR Non African Am 62        Sodium 137  140   141   Potassium 4.4  4.9   4.5   Chloride 100  101   108 (A)   Calcium 9.4  9.7   9.6   Albumin 4.00  4.80   4.10   Total Bilirubin 0.4  0.5   0.5   Alkaline Phosphatase 101  91   106   AST (SGOT) 30  29   37   ALT (SGPT) 32  25   32   WBC  9.14  13.71 (A) 13.19 (A)    Hemoglobin  14.3  15.7 7.8 (A)    Hematocrit  43.6  47.4 23.8 (A)    Platelets  346  369 429    (A) Abnormal value                      Assessment and Plan   Diagnoses and all orders for this visit:    1. Myasthenia gravis without exacerbation (HCC) (Primary)  Assessment & Plan:  Increasing weakness      Black stools    CBC,CMP     Continue AZA, prednisone     Orders:  -     CBC & Differential; Future  -     Comprehensive Metabolic Panel; Future  -     TSH; Future           Follow Up {Instructions Charge  Capture  Follow-up Communications :23}  No follow-ups on file.  Patient was given instructions and counseling regarding his condition or for health maintenance advice. Please see specific information pulled into the AVS if appropriate.

## 2022-09-13 ENCOUNTER — HOSPITAL ENCOUNTER (OUTPATIENT)
Facility: HOSPITAL | Age: 57
Discharge: HOME | End: 2022-09-14
Attending: EMERGENCY MEDICINE | Admitting: INTERNAL MEDICINE

## 2022-09-13 ENCOUNTER — TELEPHONE (OUTPATIENT)
Dept: NEUROLOGY | Facility: CLINIC | Age: 57
End: 2022-09-13

## 2022-09-13 DIAGNOSIS — R55 SYNCOPE, UNSPECIFIED SYNCOPE TYPE: ICD-10-CM

## 2022-09-13 DIAGNOSIS — K92.2 UPPER GI BLEED: Primary | ICD-10-CM

## 2022-09-13 DIAGNOSIS — D50.0 BLOOD LOSS ANEMIA: ICD-10-CM

## 2022-09-13 DIAGNOSIS — D64.9 SEVERE ANEMIA: ICD-10-CM

## 2022-09-13 DIAGNOSIS — K25.0 ACUTE GASTRIC ULCER WITH HEMORRHAGE: ICD-10-CM

## 2022-09-13 PROBLEM — I10 ESSENTIAL HYPERTENSION: Status: ACTIVE | Noted: 2022-09-13

## 2022-09-13 PROBLEM — E87.1 HYPONATREMIA: Status: ACTIVE | Noted: 2022-09-13

## 2022-09-13 PROBLEM — I25.10 CAD (CORONARY ARTERY DISEASE): Status: ACTIVE | Noted: 2022-09-13

## 2022-09-13 PROBLEM — E78.5 HYPERLIPIDEMIA: Status: ACTIVE | Noted: 2022-09-13

## 2022-09-13 LAB
ABO GROUP BLD: NORMAL
ABO GROUP BLD: NORMAL
ALBUMIN SERPL-MCNC: 3.4 G/DL (ref 3.5–5.2)
ALBUMIN SERPL-MCNC: 3.7 G/DL (ref 3.5–5.2)
ALBUMIN/GLOB SERPL: 1.5 G/DL
ALBUMIN/GLOB SERPL: 1.6 G/DL
ALP SERPL-CCNC: 251 U/L (ref 39–117)
ALP SERPL-CCNC: 251 U/L (ref 39–117)
ALT SERPL W P-5'-P-CCNC: 100 U/L (ref 1–41)
ALT SERPL W P-5'-P-CCNC: 108 U/L (ref 1–41)
ANION GAP SERPL CALCULATED.3IONS-SCNC: 10 MMOL/L (ref 5–15)
ANION GAP SERPL CALCULATED.3IONS-SCNC: 10.2 MMOL/L (ref 5–15)
AST SERPL-CCNC: 101 U/L (ref 1–40)
AST SERPL-CCNC: 126 U/L (ref 1–40)
BASOPHILS # BLD AUTO: 0.01 10*3/MM3 (ref 0–0.2)
BASOPHILS # BLD AUTO: 0.01 10*3/MM3 (ref 0–0.2)
BASOPHILS NFR BLD AUTO: 0.1 % (ref 0–1.5)
BASOPHILS NFR BLD AUTO: 0.1 % (ref 0–1.5)
BILIRUB SERPL-MCNC: 0.4 MG/DL (ref 0–1.2)
BILIRUB SERPL-MCNC: 0.4 MG/DL (ref 0–1.2)
BLD GP AB SCN SERPL QL: NEGATIVE
BUN SERPL-MCNC: 27 MG/DL (ref 6–20)
BUN SERPL-MCNC: 29 MG/DL (ref 6–20)
BUN/CREAT SERPL: 23.1 (ref 7–25)
BUN/CREAT SERPL: 35.8 (ref 7–25)
CALCIUM SPEC-SCNC: 8.3 MG/DL (ref 8.6–10.5)
CALCIUM SPEC-SCNC: 8.6 MG/DL (ref 8.6–10.5)
CHLORIDE SERPL-SCNC: 105 MMOL/L (ref 98–107)
CHLORIDE SERPL-SCNC: 99 MMOL/L (ref 98–107)
CO2 SERPL-SCNC: 24 MMOL/L (ref 22–29)
CO2 SERPL-SCNC: 24.8 MMOL/L (ref 22–29)
CREAT SERPL-MCNC: 0.81 MG/DL (ref 0.76–1.27)
CREAT SERPL-MCNC: 1.17 MG/DL (ref 0.76–1.27)
DEPRECATED RDW RBC AUTO: 49.9 FL (ref 37–54)
DEPRECATED RDW RBC AUTO: 55.5 FL (ref 37–54)
EGFRCR SERPLBLD CKD-EPI 2021: 102.2 ML/MIN/1.73
EGFRCR SERPLBLD CKD-EPI 2021: 72.3 ML/MIN/1.73
EOSINOPHIL # BLD AUTO: 0 10*3/MM3 (ref 0–0.4)
EOSINOPHIL # BLD AUTO: 0.05 10*3/MM3 (ref 0–0.4)
EOSINOPHIL NFR BLD AUTO: 0 % (ref 0.3–6.2)
EOSINOPHIL NFR BLD AUTO: 0.6 % (ref 0.3–6.2)
ERYTHROCYTE [DISTWIDTH] IN BLOOD BY AUTOMATED COUNT: 17.1 % (ref 12.3–15.4)
ERYTHROCYTE [DISTWIDTH] IN BLOOD BY AUTOMATED COUNT: 18.4 % (ref 12.3–15.4)
GLOBULIN UR ELPH-MCNC: 2.3 GM/DL
GLOBULIN UR ELPH-MCNC: 2.3 GM/DL
GLUCOSE SERPL-MCNC: 151 MG/DL (ref 65–99)
GLUCOSE SERPL-MCNC: 85 MG/DL (ref 65–99)
HAV IGM SERPL QL IA: NORMAL
HBV CORE IGM SERPL QL IA: NORMAL
HBV SURFACE AG SERPL QL IA: NORMAL
HCT VFR BLD AUTO: 21.1 % (ref 37.5–51)
HCT VFR BLD AUTO: 22.9 % (ref 37.5–51)
HCV AB SER DONR QL: NORMAL
HGB BLD-MCNC: 6.1 G/DL (ref 13–17.7)
HGB BLD-MCNC: 6.5 G/DL (ref 13–17.7)
IMM GRANULOCYTES # BLD AUTO: 0.03 10*3/MM3 (ref 0–0.05)
IMM GRANULOCYTES # BLD AUTO: 0.05 10*3/MM3 (ref 0–0.05)
IMM GRANULOCYTES NFR BLD AUTO: 0.3 % (ref 0–0.5)
IMM GRANULOCYTES NFR BLD AUTO: 0.5 % (ref 0–0.5)
IRON 24H UR-MRATE: 15 MCG/DL (ref 59–158)
IRON SATN MFR SERPL: 3 % (ref 20–50)
LYMPHOCYTES # BLD AUTO: 0.41 10*3/MM3 (ref 0.7–3.1)
LYMPHOCYTES # BLD AUTO: 2.06 10*3/MM3 (ref 0.7–3.1)
LYMPHOCYTES NFR BLD AUTO: 23.9 % (ref 19.6–45.3)
LYMPHOCYTES NFR BLD AUTO: 4 % (ref 19.6–45.3)
MCH RBC QN AUTO: 23.1 PG (ref 26.6–33)
MCH RBC QN AUTO: 23.7 PG (ref 26.6–33)
MCHC RBC AUTO-ENTMCNC: 28.4 G/DL (ref 31.5–35.7)
MCHC RBC AUTO-ENTMCNC: 28.9 G/DL (ref 31.5–35.7)
MCV RBC AUTO: 81.5 FL (ref 79–97)
MCV RBC AUTO: 82.1 FL (ref 79–97)
MONOCYTES # BLD AUTO: 0.21 10*3/MM3 (ref 0.1–0.9)
MONOCYTES # BLD AUTO: 0.66 10*3/MM3 (ref 0.1–0.9)
MONOCYTES NFR BLD AUTO: 2 % (ref 5–12)
MONOCYTES NFR BLD AUTO: 7.7 % (ref 5–12)
NEUTROPHILS NFR BLD AUTO: 5.81 10*3/MM3 (ref 1.7–7)
NEUTROPHILS NFR BLD AUTO: 67.4 % (ref 42.7–76)
NEUTROPHILS NFR BLD AUTO: 9.59 10*3/MM3 (ref 1.7–7)
NEUTROPHILS NFR BLD AUTO: 93.4 % (ref 42.7–76)
NRBC BLD AUTO-RTO: 0.3 /100 WBC (ref 0–0.2)
NRBC BLD AUTO-RTO: 0.3 /100 WBC (ref 0–0.2)
NT-PROBNP SERPL-MCNC: 206.9 PG/ML (ref 0–900)
PLATELET # BLD AUTO: 372 10*3/MM3 (ref 140–450)
PLATELET # BLD AUTO: 481 10*3/MM3 (ref 140–450)
PMV BLD AUTO: 8.8 FL (ref 6–12)
PMV BLD AUTO: 8.8 FL (ref 6–12)
POTASSIUM SERPL-SCNC: 3.5 MMOL/L (ref 3.5–5.2)
POTASSIUM SERPL-SCNC: 4.8 MMOL/L (ref 3.5–5.2)
PROT SERPL-MCNC: 5.7 G/DL (ref 6–8.5)
PROT SERPL-MCNC: 6 G/DL (ref 6–8.5)
QT INTERVAL: 456 MS
QTC INTERVAL: 443 MS
RBC # BLD AUTO: 2.57 10*6/MM3 (ref 4.14–5.8)
RBC # BLD AUTO: 2.81 10*6/MM3 (ref 4.14–5.8)
RETICS # AUTO: 0.08 10*6/MM3 (ref 0.02–0.13)
RETICS/RBC NFR AUTO: 3.02 % (ref 0.7–1.9)
RH BLD: NEGATIVE
RH BLD: NEGATIVE
SODIUM SERPL-SCNC: 133 MMOL/L (ref 136–145)
SODIUM SERPL-SCNC: 140 MMOL/L (ref 136–145)
T&S EXPIRATION DATE: NORMAL
TIBC SERPL-MCNC: 437 MCG/DL (ref 298–536)
TRANSFERRIN SERPL-MCNC: 293 MG/DL (ref 200–360)
TROPONIN T SERPL-MCNC: <0.01 NG/ML (ref 0–0.03)
TSH SERPL DL<=0.05 MIU/L-ACNC: 1.25 UIU/ML (ref 0.27–4.2)
WBC NRBC COR # BLD: 10.27 10*3/MM3 (ref 3.4–10.8)
WBC NRBC COR # BLD: 8.62 10*3/MM3 (ref 3.4–10.8)

## 2022-09-13 PROCEDURE — 86901 BLOOD TYPING SEROLOGIC RH(D): CPT | Performed by: EMERGENCY MEDICINE

## 2022-09-13 PROCEDURE — 99220 PR INITIAL OBSERVATION CARE/DAY 70 MINUTES: CPT | Performed by: HOSPITALIST

## 2022-09-13 PROCEDURE — 99284 EMERGENCY DEPT VISIT MOD MDM: CPT

## 2022-09-13 PROCEDURE — 83540 ASSAY OF IRON: CPT | Performed by: HOSPITALIST

## 2022-09-13 PROCEDURE — 86900 BLOOD TYPING SEROLOGIC ABO: CPT | Performed by: EMERGENCY MEDICINE

## 2022-09-13 PROCEDURE — G0378 HOSPITAL OBSERVATION PER HR: HCPCS

## 2022-09-13 PROCEDURE — 80053 COMPREHEN METABOLIC PANEL: CPT | Performed by: EMERGENCY MEDICINE

## 2022-09-13 PROCEDURE — 86901 BLOOD TYPING SEROLOGIC RH(D): CPT

## 2022-09-13 PROCEDURE — 85045 AUTOMATED RETICULOCYTE COUNT: CPT | Performed by: HOSPITALIST

## 2022-09-13 PROCEDURE — 86900 BLOOD TYPING SEROLOGIC ABO: CPT

## 2022-09-13 PROCEDURE — 83880 ASSAY OF NATRIURETIC PEPTIDE: CPT | Performed by: EMERGENCY MEDICINE

## 2022-09-13 PROCEDURE — 96374 THER/PROPH/DIAG INJ IV PUSH: CPT

## 2022-09-13 PROCEDURE — 84466 ASSAY OF TRANSFERRIN: CPT | Performed by: HOSPITALIST

## 2022-09-13 PROCEDURE — 80074 ACUTE HEPATITIS PANEL: CPT | Performed by: HOSPITALIST

## 2022-09-13 PROCEDURE — 86850 RBC ANTIBODY SCREEN: CPT | Performed by: EMERGENCY MEDICINE

## 2022-09-13 PROCEDURE — P9016 RBC LEUKOCYTES REDUCED: HCPCS

## 2022-09-13 PROCEDURE — 82607 VITAMIN B-12: CPT | Performed by: HOSPITALIST

## 2022-09-13 PROCEDURE — 96376 TX/PRO/DX INJ SAME DRUG ADON: CPT

## 2022-09-13 PROCEDURE — 86923 COMPATIBILITY TEST ELECTRIC: CPT

## 2022-09-13 PROCEDURE — 36430 TRANSFUSION BLD/BLD COMPNT: CPT

## 2022-09-13 PROCEDURE — 93005 ELECTROCARDIOGRAM TRACING: CPT | Performed by: EMERGENCY MEDICINE

## 2022-09-13 PROCEDURE — 85025 COMPLETE CBC W/AUTO DIFF WBC: CPT | Performed by: EMERGENCY MEDICINE

## 2022-09-13 PROCEDURE — 84484 ASSAY OF TROPONIN QUANT: CPT | Performed by: EMERGENCY MEDICINE

## 2022-09-13 PROCEDURE — 82746 ASSAY OF FOLIC ACID SERUM: CPT | Performed by: HOSPITALIST

## 2022-09-13 RX ORDER — SODIUM CHLORIDE 0.9 % (FLUSH) 0.9 %
10 SYRINGE (ML) INJECTION AS NEEDED
Status: DISCONTINUED | OUTPATIENT
Start: 2022-09-13 | End: 2022-09-14 | Stop reason: HOSPADM

## 2022-09-13 RX ORDER — BUSPIRONE HYDROCHLORIDE 10 MG/1
30 TABLET ORAL 2 TIMES DAILY
Status: DISCONTINUED | OUTPATIENT
Start: 2022-09-13 | End: 2022-09-14 | Stop reason: HOSPADM

## 2022-09-13 RX ORDER — PANTOPRAZOLE SODIUM 40 MG/10ML
80 INJECTION, POWDER, LYOPHILIZED, FOR SOLUTION INTRAVENOUS ONCE
Status: COMPLETED | OUTPATIENT
Start: 2022-09-13 | End: 2022-09-13

## 2022-09-13 RX ORDER — ACETAMINOPHEN 325 MG/1
650 TABLET ORAL EVERY 4 HOURS PRN
Status: DISCONTINUED | OUTPATIENT
Start: 2022-09-13 | End: 2022-09-14 | Stop reason: HOSPADM

## 2022-09-13 RX ORDER — BISOPROLOL FUMARATE 5 MG/1
5 TABLET, FILM COATED ORAL DAILY
Status: DISCONTINUED | OUTPATIENT
Start: 2022-09-13 | End: 2022-09-14 | Stop reason: HOSPADM

## 2022-09-13 RX ORDER — ONDANSETRON 4 MG/1
4 TABLET, FILM COATED ORAL EVERY 6 HOURS PRN
Status: DISCONTINUED | OUTPATIENT
Start: 2022-09-13 | End: 2022-09-14 | Stop reason: HOSPADM

## 2022-09-13 RX ORDER — SODIUM CHLORIDE 0.9 % (FLUSH) 0.9 %
10 SYRINGE (ML) INJECTION EVERY 12 HOURS SCHEDULED
Status: DISCONTINUED | OUTPATIENT
Start: 2022-09-13 | End: 2022-09-14 | Stop reason: HOSPADM

## 2022-09-13 RX ORDER — AMLODIPINE BESYLATE 5 MG/1
5 TABLET ORAL DAILY
Status: DISCONTINUED | OUTPATIENT
Start: 2022-09-13 | End: 2022-09-14 | Stop reason: HOSPADM

## 2022-09-13 RX ORDER — PAROXETINE HYDROCHLORIDE 20 MG/1
40 TABLET, FILM COATED ORAL DAILY
Status: DISCONTINUED | OUTPATIENT
Start: 2022-09-13 | End: 2022-09-14 | Stop reason: HOSPADM

## 2022-09-13 RX ORDER — ONDANSETRON 2 MG/ML
4 INJECTION INTRAMUSCULAR; INTRAVENOUS EVERY 6 HOURS PRN
Status: DISCONTINUED | OUTPATIENT
Start: 2022-09-13 | End: 2022-09-14 | Stop reason: HOSPADM

## 2022-09-13 RX ORDER — PANTOPRAZOLE SODIUM 40 MG/10ML
40 INJECTION, POWDER, LYOPHILIZED, FOR SOLUTION INTRAVENOUS EVERY 12 HOURS SCHEDULED
Status: DISCONTINUED | OUTPATIENT
Start: 2022-09-13 | End: 2022-09-14 | Stop reason: HOSPADM

## 2022-09-13 RX ADMIN — PANTOPRAZOLE SODIUM 40 MG: 40 INJECTION, POWDER, FOR SOLUTION INTRAVENOUS at 21:18

## 2022-09-13 RX ADMIN — AMLODIPINE BESYLATE 5 MG: 5 TABLET ORAL at 16:25

## 2022-09-13 RX ADMIN — BUSPIRONE HYDROCHLORIDE 30 MG: 15 TABLET ORAL at 21:18

## 2022-09-13 RX ADMIN — PANTOPRAZOLE SODIUM 80 MG: 40 INJECTION, POWDER, FOR SOLUTION INTRAVENOUS at 12:01

## 2022-09-13 RX ADMIN — PAROXETINE HYDROCHLORIDE 40 MG: 20 TABLET, FILM COATED ORAL at 16:25

## 2022-09-13 RX ADMIN — Medication 10 ML: at 21:18

## 2022-09-13 NOTE — ED PROVIDER NOTES
" EMERGENCY DEPARTMENT ENCOUNTER    Pt Name: Jaiden Burdick  MRN: [id number]  Pt :   1965  Room Number:    Date of encounter:  2022  PCP: Eze Hammond MD  ED Provider: Shadi Contreras MD    Historian: Patient and wife      HPI:  Chief Complaint: Low blood count and rectal bleeding        Context: Jaiden Burdick is a 57 y.o. male who presents to the ED c/o rectal bleeding which both he and his wife state has been present for years but have increased since he was started on aspirin and Effient by Dr. Joseph Welch after placing coronary stents.  Now over the last week the patient has developed blackish stools which she has not seen previously.  The patient underwent colonoscopy roughly 3 years ago but does not recall any diagnoses.  He underwent EGD 6 to 7 years ago but again does not recall if he had an ulcer.  His wife states that he takes ibuprofen \"like candy\".  He does not drink alcohol.  He has not suffered from a severe GI bleed in the past and has not required blood transfusion.  2 days ago the patient had a syncopal episode out of getting out of the vehicle.  He was only unconscious for a second or 2 but felt extremely weak in the upright position.  At no point has he developed chest pain or palpitations.  He does have dyspnea on exertion.      PAST MEDICAL HISTORY  Past Medical History:   Diagnosis Date   • Coronary artery disease    • Depression    • Difficulty walking    • Headache, tension-type    • HL (hearing loss) 2005   • Hyperlipidemia    • Hypertension    • Memory loss    • Migraine     COVID   • Movement disorder    • Sleep apnea 2008   • Vision loss          PAST SURGICAL HISTORY  Past Surgical History:   Procedure Laterality Date   • CAROTID STENT     • COLONOSCOPY  2016    [facility]   • VASCULAR SURGERY           FAMILY HISTORY  Family History   Problem Relation Age of Onset   • Heart disease Father    • Arthritis Sister    • Heart " disease Sister    • Hypertension Sister    • Hyperlipidemia Sister    • Migraines Sister    • Neuropathy Sister          SOCIAL HISTORY  Social History     Socioeconomic History   • Marital status: Single   Tobacco Use   • Smoking status: Never Smoker   • Smokeless tobacco: Never Used   Vaping Use   • Vaping Use: Never used   Substance and Sexual Activity   • Alcohol use: Never   • Drug use: Never   • Sexual activity: Yes     Partners: Female         ALLERGIES  Oxycodone        REVIEW OF SYSTEMS  Review of Systems       All systems reviewed and negative except for those discussed in HPI.       PHYSICAL EXAM    I have reviewed the triage vital signs and nursing notes.    ED Triage Vitals [09/13/22 1023]   Temp Heart Rate Resp BP SpO2   98.3 °F (36.8 °C) 56 18 142/63 100 %      Temp src Heart Rate Source Patient Position BP Location FiO2 (%)   Oral Monitor Sitting Left arm --       Physical Exam  GENERAL:   Appears very pleasant, nontoxic.  He presents with his wife is a very good historian.  HENT: Nares patent.  Slightly dry mucous membranes  EYES: No scleral icterus.  CV: Regular rhythm, bradycardic rate.  No murmurs gallops rubs  RESPIRATORY: Normal effort.  No audible wheezes, rales or rhonchi.  Clear to auscultation  ABDOMEN: Soft, nontender to deep palpation.  Protuberant with adipose.  MUSCULOSKELETAL: No deformities.   NEURO: Alert, moves all extremities, follows commands.  SKIN: Warm, dry, no rash visualized.  Anorectal: Strongly guaiac positive dark brown stool.      LAB RESULTS  Recent Results (from the past 24 hour(s))   Comprehensive Metabolic Panel    Collection Time: 09/12/22  1:56 PM    Specimen: Blood   Result Value Ref Range    Glucose 151 (H) 65 - 99 mg/dL    BUN 27 (H) 6 - 20 mg/dL    Creatinine 1.17 0.76 - 1.27 mg/dL    Sodium 140 136 - 145 mmol/L    Potassium 4.8 3.5 - 5.2 mmol/L    Chloride 105 98 - 107 mmol/L    CO2 24.8 22.0 - 29.0 mmol/L    Calcium 8.6 8.6 - 10.5 mg/dL    Total Protein 6.0  6.0 - 8.5 g/dL    Albumin 3.70 3.50 - 5.20 g/dL    ALT (SGPT) 100 (H) 1 - 41 U/L    AST (SGOT) 126 (H) 1 - 40 U/L    Alkaline Phosphatase 251 (H) 39 - 117 U/L    Total Bilirubin 0.4 0.0 - 1.2 mg/dL    Globulin 2.3 gm/dL    A/G Ratio 1.6 g/dL    BUN/Creatinine Ratio 23.1 7.0 - 25.0    Anion Gap 10.2 5.0 - 15.0 mmol/L    eGFR 72.3 >60.0 mL/min/1.73   TSH    Collection Time: 09/12/22  1:56 PM    Specimen: Blood   Result Value Ref Range    TSH 1.250 0.270 - 4.200 uIU/mL   CBC Auto Differential    Collection Time: 09/12/22  1:56 PM    Specimen: Blood   Result Value Ref Range    WBC 10.27 3.40 - 10.80 10*3/mm3    RBC 2.81 (L) 4.14 - 5.80 10*6/mm3    Hemoglobin 6.5 (C) 13.0 - 17.7 g/dL    Hematocrit 22.9 (L) 37.5 - 51.0 %    MCV 81.5 79.0 - 97.0 fL    MCH 23.1 (L) 26.6 - 33.0 pg    MCHC 28.4 (L) 31.5 - 35.7 g/dL    RDW 17.1 (H) 12.3 - 15.4 %    RDW-SD 49.9 37.0 - 54.0 fl    MPV 8.8 6.0 - 12.0 fL    Platelets 481 (H) 140 - 450 10*3/mm3    Neutrophil % 93.4 (H) 42.7 - 76.0 %    Lymphocyte % 4.0 (L) 19.6 - 45.3 %    Monocyte % 2.0 (L) 5.0 - 12.0 %    Eosinophil % 0.0 (L) 0.3 - 6.2 %    Basophil % 0.1 0.0 - 1.5 %    Immature Grans % 0.5 0.0 - 0.5 %    Neutrophils, Absolute 9.59 (H) 1.70 - 7.00 10*3/mm3    Lymphocytes, Absolute 0.41 (L) 0.70 - 3.10 10*3/mm3    Monocytes, Absolute 0.21 0.10 - 0.90 10*3/mm3    Eosinophils, Absolute 0.00 0.00 - 0.40 10*3/mm3    Basophils, Absolute 0.01 0.00 - 0.20 10*3/mm3    Immature Grans, Absolute 0.05 0.00 - 0.05 10*3/mm3    nRBC 0.3 (H) 0.0 - 0.2 /100 WBC       If labs were ordered, I independently reviewed the results.        RADIOLOGY  No Radiology Exams Resulted Within Past 24 Hours    I ordered and reviewed the above noted radiographic studies.        See radiologist's dictation for official interpretation.        PROCEDURES    Critical Care  Performed by: Shadi Contreras MD  Authorized by: Shadi Contreras MD     Critical care provider statement:     Critical care time (minutes):  30     Critical care time was exclusive of:  Separately billable procedures and treating other patients    Critical care was necessary to treat or prevent imminent or life-threatening deterioration of the following conditions:  Circulatory failure    Critical care was time spent personally by me on the following activities:  Ordering and performing treatments and interventions, ordering and review of laboratory studies, ordering and review of radiographic studies, pulse oximetry, re-evaluation of patient's condition, review of old charts, obtaining history from patient or surrogate, examination of patient, evaluation of patient's response to treatment, discussions with consultants and development of treatment plan with patient or surrogate        ECG 12 Lead    (Results Pending)       MEDICATIONS GIVEN IN ER    Medications   pantoprazole (PROTONIX) injection 80 mg (has no administration in time range)         PROGRESS, DATA ANALYSIS, CONSULTS, AND MEDICAL DECISION MAKING    All labs have been independently reviewed by me.  All radiology studies have been reviewed by me and the radiologist dictating the report.   EKG's have been independently viewed and interpreted by me.      Differential diagnoses: Probable upper GI bleed secondary to NSAID induced ulcer.  Profound anemia requiring blood transfusion.      ED Course as of 09/13/22 1147   Tue Sep 13, 2022   1145 I reviewed records which show a hemoglobin of 6.5 from labs drawn yesterday.  I have ordered packed red blood cells x2 units as well as high-strength Protonix IV.  Patient will be admitted as soon as laboratory is returned. [MS]      ED Course User Index  [MS] Shadi Contreras MD             AS OF 11:47 EDT VITALS:    BP - 121/69  HR - 54  TEMP - 98.3 °F (36.8 °C) (Oral)  O2 SATS - 96%                  DIAGNOSIS  Final diagnoses:   Upper GI bleed   Severe anemia   Blood loss anemia   Syncope, unspecified syncope type         DISPOSITION  Admit           Ben  Shadi RIVERA MD  09/14/22 [id number]

## 2022-09-13 NOTE — H&P
"    [facility] Medicine Services  HISTORY AND PHYSICAL    Patient Name: Jaiden Burdick  : 1965  MRN: [id number]  Primary Care Physician: Eze Hammond MD  Date of admission: 2022      Subjective   Subjective     Chief Complaint:  Melena, anemia    HPI:  Jaiden Burdick is a 57 y.o. male with hx of myasthenia gravis followed by Dr. Garza, HTN, HLD, CAD s/p recent stent in [place] 5-6 months ago and started on ASA and Effient at that time, GERD, and depression who presents due to abnormal outpatient labs showing significant anemia with Hb 6.5 on 22. Instructed to come to [facility] ER for further evaluation. Patient reports intermittent bright red blood per rectum for \"years\" but only over the past 1 week has he noticed black stools. He reports worsening fatigue, generalized weakness, peripheral edema, dyspnea, and dizziness. Had an episode of syncope roughly 1 week ago. In the ER, his labs showed Hb 6.1. Admitted for further management.      Review of Systems   Gen-no fevers, no chills  CV-no chest pain, no palpitations  Resp-no cough, +dyspnea  GI-no N/V/D, no abd pain, +melena     All other systems reviewed and negative except any additional pertinent positives and negatives as discussed in HPI.    All other systems reviewed and are negative.     Personal History     Past Medical History:   Diagnosis Date   • Coronary artery disease    • Depression    • Difficulty walking    • Headache, tension-type    • HL (hearing loss) 2005   • Hyperlipidemia    • Hypertension    • Memory loss    • Migraine     COVID   • Movement disorder 2020   • Sleep apnea 2008   • Vision loss              Past Surgical History:   Procedure Laterality Date   • CAROTID STENT     • COLONOSCOPY  2016    [facility]   • VASCULAR SURGERY         Family History:  family history includes Arthritis in his sister; Heart disease in his father and sister; Hyperlipidemia in " his sister; Hypertension in his sister; Migraines in his sister; Neuropathy in his sister. Otherwise pertinent FHx was reviewed and unremarkable.     Social History:  reports that he has never smoked. He has never used smokeless tobacco. He reports that he does not drink alcohol and does not use drugs.  Social History     Social History Narrative   • Not on file       Medications:  Available home medication information reviewed.  (Not in a hospital admission)      Allergies   Allergen Reactions   • Oxycodone Swelling     This is from when he was 11 years old.        Objective   Objective     Vital Signs:   Temp:  [98.3 °F (36.8 °C)] 98.3 °F (36.8 °C)  Heart Rate:  [54-59] 59  Resp:  [18] 18  BP: (121-142)/(63-74) 140/65       Physical Exam   Constitutional: Awake, alert, pale appearance  Eyes: PERRLA, sclerae anicteric, no conjunctival injection  HENT: NCAT, mucous membranes moist  Neck: Supple, no thyromegaly, no lymphadenopathy, trachea midline  Respiratory: Clear to auscultation bilaterally, nonlabored respirations   Cardiovascular: RRR, no murmurs, rubs, or gallops, palpable pedal pulses bilaterally  Gastrointestinal: Positive bowel sounds, soft, nontender, nondistended  Musculoskeletal: trace BLE edema, no clubbing or cyanosis to extremities  Psychiatric: Appropriate affect, cooperative  Neurologic: Oriented x 3, strength symmetric in all extremities, Cranial Nerves grossly intact to confrontation, speech clear  Skin: No rashes      Result Review:  I have personally reviewed the results from the time of this admission to 9/13/2022 14:37 EDT and agree with these findings:  [x]  Laboratory list / accordion  []  Microbiology  []  Radiology  []  EKG/Telemetry   []  Cardiology/Vascular   []  Pathology  [x]  Old records  []  Other:      LAB RESULTS:      Lab 09/13/22  1150 09/12/22  1356   WBC 8.62 10.27   HEMOGLOBIN 6.1* 6.5*   HEMATOCRIT 21.1* 22.9*   PLATELETS 372 481*   NEUTROS ABS 5.81 9.59*   IMMATURE GRANS  (ABS) 0.03 0.05   LYMPHS ABS 2.06 0.41*   MONOS ABS 0.66 0.21   EOS ABS 0.05 0.00   MCV 82.1 81.5         Lab 09/13/22  1150 09/12/22  1356   SODIUM 133* 140   POTASSIUM 3.5 4.8   CHLORIDE 99 105   CO2 24.0 24.8   ANION GAP 10.0 10.2   BUN 29* 27*   CREATININE 0.81 1.17   EGFR 102.2 72.3   GLUCOSE 85 151*   CALCIUM 8.3* 8.6   TSH  --  1.250         Lab 09/13/22  1150 09/12/22  1356   TOTAL PROTEIN 5.7* 6.0   ALBUMIN 3.40* 3.70   GLOBULIN 2.3 2.3   ALT (SGPT) 108* 100*   AST (SGOT) 101* 126*   BILIRUBIN 0.4 0.4   ALK PHOS 251* 251*         Lab 09/13/22  1150   PROBNP 206.9   TROPONIN T <0.010             Lab 09/13/22  1325 09/13/22  1150   ABO TYPING A A   RH TYPING Negative Negative   ANTIBODY SCREEN  --  Negative             Microbiology Results (last 10 days)     ** No results found for the last 240 hours. **          No radiology results from the last 24 hrs        Assessment & Plan   Assessment & Plan     Active Hospital Problems    Diagnosis  POA   • **Upper GI bleed [K92.2]  Yes   • Symptomatic anemia [D64.9]  Yes   • CAD (coronary artery disease) [I25.10]  Yes   • Essential hypertension [I10]  Yes   • Hyperlipidemia [E78.5]  Yes   • Hyponatremia [E87.1]  Yes   • Myasthenia gravis without exacerbation (HCC) [G70.00]  Yes       58 yo M with hx of myasthenia gravis followed by Dr. Garza, HTN, HLD, CAD s/p recent stent in [place] 5-6 months ago by Dr. Bautista and started on ASA and Effient at that time, GERD, and depression who presents due to melena and symptomatic anemia. Hb 6.1 on arrival to ER. Admitted for further management.    Acute upper GI bleed  Symptomatic anemia  --Hb 6.1 in the ER. Was 7.8 in June 2022 and 15.7 in April 2022. Multiple risk factors for GI bleed including ASA, Effient, Diclofenac (NSAID), prednisone.  --Hold ASA and Effient.   --Hold Diclofenac.  --Check anemia labs and FOBT.  --Consult GI. Allow clear liquids tonight, then keep NPO after midnight for anticipated EGD tomorrow.    --Continue IV Protonix BID.   --Geoffrey H&H Q8H.    CAD s/p stent 5-6 months ago  --Records requested, had stent placed by Dr. Bautista in [place], [place]/[facility].   --Hold ASA and Effient for now.   --Continue Bisoprolol.    Elevated LFT's  --New compared to previous labs.  --Check acute hepatitis panel.  --Check RUQ ultrasound.  --Hold statin.  --Repeat CMP in AM.    HTN  HLD  --Continue home Norvasc, Bisoprolol.  --Hold statin due to elevated LFT's.    Myasthenia gravis  --On Imuran and prednisone as outpatient. Follows with Dr. Garza, just seen in office 9/12/22.  --Hold Imuran and prednisone at this time.     Depression  --Continue home Buspar, Paxil.    GERD  --Continue IV Protonix as above.    DVT prophylaxis:  Mechanical due to GI bleed      CODE STATUS:    Code Status and Medical Interventions:   Ordered at: 09/13/22 1415     Code Status (Patient has no pulse and is not breathing):    CPR (Attempt to Resuscitate)     Medical Interventions (Patient has pulse or is breathing):    Full Support         Cristin Frank MD  09/13/22

## 2022-09-13 NOTE — TELEPHONE ENCOUNTER
Notified patient needs to go to [facility] ED, severely anemic and to stop azathioprine. He states this is over his head so he is going to have Bjorn call me.   Notified this is urgent so wife will call office.

## 2022-09-13 NOTE — TELEPHONE ENCOUNTER
Called wife, Bjorn notified of results/'s recommendations. As well, since she is currently at work I am sending her a work excuse for today so she can get him up the  ED. Emphasized must be [employer]. They will see lab levels from yesterday. She states understanding.     Providing work excuse to [email or web address] (Updated chart as she spells her name Kaleb Bates)    Sending Letter now.

## 2022-09-13 NOTE — PROGRESS NOTES
Please call pt and have him go to [facility] ED he is severely anemic.    Stop azathioprine

## 2022-09-13 NOTE — TELEPHONE ENCOUNTER
----- Message from Franco Garza MD sent at 9/13/2022  7:46 AM EDT -----  Please call pt and have him go to [facility] [place] ED he is severely anemic.    Stop azathioprine

## 2022-09-14 ENCOUNTER — ANESTHESIA EVENT (OUTPATIENT)
Dept: GASTROENTEROLOGY | Facility: HOSPITAL | Age: 57
End: 2022-09-14

## 2022-09-14 ENCOUNTER — ANESTHESIA (OUTPATIENT)
Dept: GASTROENTEROLOGY | Facility: HOSPITAL | Age: 57
End: 2022-09-14

## 2022-09-14 ENCOUNTER — APPOINTMENT (OUTPATIENT)
Dept: ULTRASOUND IMAGING | Facility: HOSPITAL | Age: 57
End: 2022-09-14

## 2022-09-14 VITALS
DIASTOLIC BLOOD PRESSURE: 71 MMHG | BODY MASS INDEX: 35.91 KG/M2 | WEIGHT: 279.8 LBS | TEMPERATURE: 97 F | HEART RATE: 70 BPM | SYSTOLIC BLOOD PRESSURE: 119 MMHG | HEIGHT: 74 IN | RESPIRATION RATE: 16 BRPM | OXYGEN SATURATION: 94 %

## 2022-09-14 PROBLEM — E87.1 HYPONATREMIA: Status: RESOLVED | Noted: 2022-09-13 | Resolved: 2022-09-14

## 2022-09-14 PROBLEM — K92.2 UPPER GI BLEED: Status: RESOLVED | Noted: 2022-09-13 | Resolved: 2022-09-14

## 2022-09-14 PROBLEM — K92.2 UPPER GI BLEED: Status: ACTIVE | Noted: 2022-09-14

## 2022-09-14 PROBLEM — D64.9 SYMPTOMATIC ANEMIA: Status: RESOLVED | Noted: 2022-09-13 | Resolved: 2022-09-14

## 2022-09-14 LAB
ALBUMIN SERPL-MCNC: 3.3 G/DL (ref 3.5–5.2)
ALBUMIN/GLOB SERPL: 1.3 G/DL
ALP SERPL-CCNC: 237 U/L (ref 39–117)
ALT SERPL W P-5'-P-CCNC: 140 U/L (ref 1–41)
ANION GAP SERPL CALCULATED.3IONS-SCNC: 12 MMOL/L (ref 5–15)
AST SERPL-CCNC: 131 U/L (ref 1–40)
BH BB BLOOD EXPIRATION DATE: NORMAL
BH BB BLOOD EXPIRATION DATE: NORMAL
BH BB BLOOD TYPE BARCODE: 600
BH BB BLOOD TYPE BARCODE: 600
BH BB DISPENSE STATUS: NORMAL
BH BB DISPENSE STATUS: NORMAL
BH BB PRODUCT CODE: NORMAL
BH BB PRODUCT CODE: NORMAL
BH BB UNIT NUMBER: NORMAL
BH BB UNIT NUMBER: NORMAL
BILIRUB SERPL-MCNC: 1.7 MG/DL (ref 0–1.2)
BUN SERPL-MCNC: 17 MG/DL (ref 6–20)
BUN/CREAT SERPL: 21.3 (ref 7–25)
CALCIUM SPEC-SCNC: 8.7 MG/DL (ref 8.6–10.5)
CHLORIDE SERPL-SCNC: 103 MMOL/L (ref 98–107)
CO2 SERPL-SCNC: 24 MMOL/L (ref 22–29)
CREAT SERPL-MCNC: 0.8 MG/DL (ref 0.76–1.27)
CROSSMATCH INTERPRETATION: NORMAL
CROSSMATCH INTERPRETATION: NORMAL
DEPRECATED RDW RBC AUTO: 58.5 FL (ref 37–54)
EGFRCR SERPLBLD CKD-EPI 2021: 102.6 ML/MIN/1.73
ERYTHROCYTE [DISTWIDTH] IN BLOOD BY AUTOMATED COUNT: 19 % (ref 12.3–15.4)
FOLATE SERPL-MCNC: 12.1 NG/ML (ref 4.78–24.2)
GLOBULIN UR ELPH-MCNC: 2.5 GM/DL
GLUCOSE SERPL-MCNC: 70 MG/DL (ref 65–99)
HCT VFR BLD AUTO: 26.5 % (ref 37.5–51)
HCT VFR BLD AUTO: 27.8 % (ref 37.5–51)
HCT VFR BLD AUTO: 30 % (ref 37.5–51)
HEMOCCULT STL QL: POSITIVE
HGB BLD-MCNC: 8.3 G/DL (ref 13–17.7)
HGB BLD-MCNC: 8.6 G/DL (ref 13–17.7)
HGB BLD-MCNC: 8.8 G/DL (ref 13–17.7)
MCH RBC QN AUTO: 24.3 PG (ref 26.6–33)
MCHC RBC AUTO-ENTMCNC: 28.7 G/DL (ref 31.5–35.7)
MCV RBC AUTO: 84.7 FL (ref 79–97)
PLATELET # BLD AUTO: 348 10*3/MM3 (ref 140–450)
PMV BLD AUTO: 8.7 FL (ref 6–12)
POTASSIUM SERPL-SCNC: 4.4 MMOL/L (ref 3.5–5.2)
PROT SERPL-MCNC: 5.8 G/DL (ref 6–8.5)
RBC # BLD AUTO: 3.54 10*6/MM3 (ref 4.14–5.8)
SODIUM SERPL-SCNC: 139 MMOL/L (ref 136–145)
UNIT  ABO: NORMAL
UNIT  ABO: NORMAL
UNIT  RH: NORMAL
UNIT  RH: NORMAL
VIT B12 BLD-MCNC: 760 PG/ML (ref 211–946)
WBC NRBC COR # BLD: 6.72 10*3/MM3 (ref 3.4–10.8)

## 2022-09-14 PROCEDURE — G0378 HOSPITAL OBSERVATION PER HR: HCPCS

## 2022-09-14 PROCEDURE — 97535 SELF CARE MNGMENT TRAINING: CPT

## 2022-09-14 PROCEDURE — 43239 EGD BIOPSY SINGLE/MULTIPLE: CPT | Performed by: INTERNAL MEDICINE

## 2022-09-14 PROCEDURE — 88305 TISSUE EXAM BY PATHOLOGIST: CPT | Performed by: INTERNAL MEDICINE

## 2022-09-14 PROCEDURE — 80053 COMPREHEN METABOLIC PANEL: CPT | Performed by: HOSPITALIST

## 2022-09-14 PROCEDURE — 99217 PR OBSERVATION CARE DISCHARGE MANAGEMENT: CPT | Performed by: HOSPITALIST

## 2022-09-14 PROCEDURE — 85027 COMPLETE CBC AUTOMATED: CPT | Performed by: HOSPITALIST

## 2022-09-14 PROCEDURE — 96376 TX/PRO/DX INJ SAME DRUG ADON: CPT

## 2022-09-14 PROCEDURE — 85018 HEMOGLOBIN: CPT | Performed by: HOSPITALIST

## 2022-09-14 PROCEDURE — 99222 1ST HOSP IP/OBS MODERATE 55: CPT | Performed by: PHYSICIAN ASSISTANT

## 2022-09-14 PROCEDURE — 76705 ECHO EXAM OF ABDOMEN: CPT

## 2022-09-14 PROCEDURE — 82272 OCCULT BLD FECES 1-3 TESTS: CPT | Performed by: HOSPITALIST

## 2022-09-14 PROCEDURE — 97165 OT EVAL LOW COMPLEX 30 MIN: CPT

## 2022-09-14 PROCEDURE — 85014 HEMATOCRIT: CPT | Performed by: HOSPITALIST

## 2022-09-14 PROCEDURE — 25010000002 PROPOFOL 10 MG/ML EMULSION

## 2022-09-14 RX ORDER — LIDOCAINE HYDROCHLORIDE 10 MG/ML
0.5 INJECTION, SOLUTION EPIDURAL; INFILTRATION; INTRACAUDAL; PERINEURAL ONCE AS NEEDED
Status: CANCELLED | OUTPATIENT
Start: 2022-09-14

## 2022-09-14 RX ORDER — IPRATROPIUM BROMIDE AND ALBUTEROL SULFATE 2.5; .5 MG/3ML; MG/3ML
3 SOLUTION RESPIRATORY (INHALATION) ONCE AS NEEDED
Status: DISCONTINUED | OUTPATIENT
Start: 2022-09-14 | End: 2022-09-14 | Stop reason: HOSPADM

## 2022-09-14 RX ORDER — ASPIRIN 81 MG/1
81 TABLET ORAL DAILY
Status: DISCONTINUED | OUTPATIENT
Start: 2022-09-14 | End: 2022-09-14 | Stop reason: HOSPADM

## 2022-09-14 RX ORDER — MIDAZOLAM HYDROCHLORIDE 1 MG/ML
1 INJECTION INTRAMUSCULAR; INTRAVENOUS
Status: CANCELLED | OUTPATIENT
Start: 2022-09-14

## 2022-09-14 RX ORDER — SODIUM CHLORIDE, SODIUM LACTATE, POTASSIUM CHLORIDE, CALCIUM CHLORIDE 600; 310; 30; 20 MG/100ML; MG/100ML; MG/100ML; MG/100ML
INJECTION, SOLUTION INTRAVENOUS CONTINUOUS PRN
Status: DISCONTINUED | OUTPATIENT
Start: 2022-09-14 | End: 2022-09-14 | Stop reason: SURG

## 2022-09-14 RX ORDER — PANTOPRAZOLE SODIUM 40 MG/1
40 TABLET, DELAYED RELEASE ORAL 2 TIMES DAILY
Qty: 60 TABLET | Refills: 0 | Status: SHIPPED | OUTPATIENT
Start: 2022-09-14 | End: 2022-10-14

## 2022-09-14 RX ORDER — PROPOFOL 10 MG/ML
VIAL (ML) INTRAVENOUS AS NEEDED
Status: DISCONTINUED | OUTPATIENT
Start: 2022-09-14 | End: 2022-09-14 | Stop reason: SURG

## 2022-09-14 RX ORDER — SODIUM CHLORIDE 0.9 % (FLUSH) 0.9 %
10 SYRINGE (ML) INJECTION AS NEEDED
Status: CANCELLED | OUTPATIENT
Start: 2022-09-14

## 2022-09-14 RX ORDER — SODIUM CHLORIDE, SODIUM LACTATE, POTASSIUM CHLORIDE, CALCIUM CHLORIDE 600; 310; 30; 20 MG/100ML; MG/100ML; MG/100ML; MG/100ML
9 INJECTION, SOLUTION INTRAVENOUS CONTINUOUS
Status: CANCELLED | OUTPATIENT
Start: 2022-09-14

## 2022-09-14 RX ORDER — FAMOTIDINE 20 MG/1
20 TABLET, FILM COATED ORAL ONCE
Status: CANCELLED | OUTPATIENT
Start: 2022-09-14 | End: 2022-09-14

## 2022-09-14 RX ORDER — LIDOCAINE HYDROCHLORIDE 10 MG/ML
INJECTION, SOLUTION EPIDURAL; INFILTRATION; INTRACAUDAL; PERINEURAL AS NEEDED
Status: DISCONTINUED | OUTPATIENT
Start: 2022-09-14 | End: 2022-09-14 | Stop reason: SURG

## 2022-09-14 RX ORDER — SODIUM CHLORIDE 0.9 % (FLUSH) 0.9 %
10 SYRINGE (ML) INJECTION EVERY 12 HOURS SCHEDULED
Status: CANCELLED | OUTPATIENT
Start: 2022-09-14

## 2022-09-14 RX ORDER — ONDANSETRON 2 MG/ML
4 INJECTION INTRAMUSCULAR; INTRAVENOUS ONCE AS NEEDED
Status: DISCONTINUED | OUTPATIENT
Start: 2022-09-14 | End: 2022-09-14 | Stop reason: HOSPADM

## 2022-09-14 RX ORDER — FAMOTIDINE 10 MG/ML
20 INJECTION, SOLUTION INTRAVENOUS ONCE
Status: CANCELLED | OUTPATIENT
Start: 2022-09-14 | End: 2022-09-14

## 2022-09-14 RX ADMIN — SODIUM CHLORIDE, POTASSIUM CHLORIDE, SODIUM LACTATE AND CALCIUM CHLORIDE: 600; 310; 30; 20 INJECTION, SOLUTION INTRAVENOUS at 12:30

## 2022-09-14 RX ADMIN — PANTOPRAZOLE SODIUM 40 MG: 40 INJECTION, POWDER, FOR SOLUTION INTRAVENOUS at 09:52

## 2022-09-14 RX ADMIN — LIDOCAINE HYDROCHLORIDE 50 MG: 10 INJECTION, SOLUTION EPIDURAL; INFILTRATION; INTRACAUDAL; PERINEURAL at 12:40

## 2022-09-14 RX ADMIN — Medication 10 ML: at 09:52

## 2022-09-14 RX ADMIN — PROPOFOL 80 MG: 10 INJECTION, EMULSION INTRAVENOUS at 12:40

## 2022-09-14 RX ADMIN — PROPOFOL 50 MG: 10 INJECTION, EMULSION INTRAVENOUS at 12:45

## 2022-09-14 NOTE — ANESTHESIA POSTPROCEDURE EVALUATION
Patient: Jaiden Burdick    Procedure Summary     Date: 09/14/22 Room / Location:  [facility] /  [facility]    Anesthesia Start: 1232 Anesthesia Stop: 1255    Procedure: ESOPHAGOGASTRODUODENOSCOPY (N/A ) Diagnosis:     Surgeons: Brunner, Mark I, MD Provider: Lex Douglass MD    Anesthesia Type: MAC ASA Status: 3          Anesthesia Type: MAC    Vitals  Vitals Value Taken Time   /52 09/14/22 1255   Temp 97 °F (36.1 °C) 09/14/22 1255   Pulse 71 09/14/22 1255   Resp 14 09/14/22 1255   SpO2 95 % 09/14/22 1255           Post Anesthesia Care and Evaluation    Patient location during evaluation: PACU  Patient participation: complete - patient participated  Level of consciousness: awake  Pain management: adequate    Airway patency: patent  Anesthetic complications: No anesthetic complications  PONV Status: none  Cardiovascular status: hemodynamically stable and acceptable  Respiratory status: nonlabored ventilation, acceptable and nasal cannula  Hydration status: acceptable

## 2022-09-14 NOTE — ANESTHESIA PREPROCEDURE EVALUATION
Anesthesia Evaluation     Patient summary reviewed and Nursing notes reviewed   NPO Solid Status: > 8 hours  NPO Liquid Status: > 8 hours           Airway   Mallampati: II  TM distance: >3 FB  Neck ROM: full  No difficulty expected  Dental - normal exam     Pulmonary     breath sounds clear to auscultation  Cardiovascular   Exercise tolerance: poor (<4 METS)    Rhythm: regular  Rate: normal        Neuro/Psych  GI/Hepatic/Renal/Endo      Musculoskeletal     Abdominal    Substance History      OB/GYN          Other                      Anesthesia Plan    ASA 3     MAC     intravenous induction     Anesthetic plan, risks, benefits, and alternatives have been provided, discussed and informed consent has been obtained with: patient.        CODE STATUS:    Code Status (Patient has no pulse and is not breathing): CPR (Attempt to Resuscitate)  Medical Interventions (Patient has pulse or is breathing): Full Support

## 2022-09-14 NOTE — PLAN OF CARE
Problem: Adult Inpatient Plan of Care  Goal: Plan of Care Review  Outcome: Adequate for Care Transition  Goal: Patient-Specific Goal (Individualized)  Outcome: Adequate for Care Transition  Goal: Absence of Hospital-Acquired Illness or Injury  Outcome: Adequate for Care Transition  Intervention: Identify and Manage Fall Risk  Recent Flowsheet Documentation  Taken 9/14/2022 1030 by Stefania Soto RN  Safety Promotion/Fall Prevention:   activity supervised   fall prevention program maintained   nonskid shoes/slippers when out of bed   room organization consistent   safety round/check completed   toileting scheduled  Taken 9/14/2022 0830 by Stefania Soto RN  Safety Promotion/Fall Prevention:   activity supervised   fall prevention program maintained   nonskid shoes/slippers when out of bed   room organization consistent   safety round/check completed   toileting scheduled  Intervention: Prevent Skin Injury  Recent Flowsheet Documentation  Taken 9/14/2022 0830 by Stefania Soto RN  Skin Protection:   adhesive use limited   incontinence pads utilized  Intervention: Prevent and Manage VTE (Venous Thromboembolism) Risk  Recent Flowsheet Documentation  Taken 9/14/2022 1030 by Stefania Soto RN  Activity Management: activity adjusted per tolerance  Taken 9/14/2022 0830 by Stefania Soto RN  Activity Management: activity adjusted per tolerance  Goal: Optimal Comfort and Wellbeing  Outcome: Adequate for Care Transition  Intervention: Provide Person-Centered Care  Recent Flowsheet Documentation  Taken 9/14/2022 0830 by Stefania Soto RN  Trust Relationship/Rapport:   care explained   choices provided   emotional support provided   empathic listening provided   questions answered   questions encouraged   reassurance provided   thoughts/feelings acknowledged  Goal: Readiness for Transition of Care  Outcome: Adequate for Care Transition   Goal Outcome Evaluation:

## 2022-09-14 NOTE — THERAPY DISCHARGE NOTE
Acute Care - Occupational Therapy Discharge  [facility]    Patient Name: Jaiden Burdick  : 1965    MRN: [id number]                              Today's Date: 2022       Admit Date: 2022    Visit Dx:     ICD-10-CM ICD-9-CM   1. Upper GI bleed  K92.2 578.9   2. Severe anemia  D64.9 285.9   3. Blood loss anemia  D50.0 280.0   4. Syncope, unspecified syncope type  R55 780.2   5. Acute gastric ulcer with hemorrhage  K25.0 531.00     Patient Active Problem List   Diagnosis   • Myasthenia gravis without exacerbation (HCC)   • Gastroesophageal reflux disease without esophagitis   • CAD (coronary artery disease)   • Essential hypertension   • Hyperlipidemia     Past Medical History:   Diagnosis Date   • Coronary artery disease    • Depression    • Difficulty walking    • Headache, tension-type    • HL (hearing loss)    • Hyperlipidemia    • Hypertension    • Memory loss    • Migraine     COVID   • Movement disorder    • Sleep apnea    • Vision loss      Past Surgical History:   Procedure Laterality Date   • CAROTID STENT     • COLONOSCOPY      [facility] [place]   • VASCULAR SURGERY        General Information     Row Name 22 1400          OT Time and Intention    Document Type discharge evaluation/summary  -SW     Mode of Treatment occupational therapy  -     Row Name 22 1400          General Information    Patient Profile Reviewed yes  -SW     Prior Level of Function independent:;all household mobility;community mobility;transfer;w/c or scooter;ADL's;driving;work  -SW     Existing Precautions/Restrictions fall  -SW     Barriers to Rehab none identified  -     Row Name 22 1400          Occupational Profile    Environmental Supports and Barriers (Occupational Profile) Pt has a scooter and a rw at home.  -     Row Name 22 1400          Living Environment    People in Home spouse  -     Row Name 22 1400          Cognition     Orientation Status (Cognition) oriented x 4  -SW     Row Name 09/14/22 1400          Safety Issues, Functional Mobility    Safety Issues Affecting Function (Mobility) safety precaution awareness  -     Impairments Affecting Function (Mobility) balance  -           User Key  (r) = Recorded By, (t) = Taken By, (c) = Cosigned By    Initials Name Provider Type    Radha Linares OT Occupational Therapist               Mobility/ADL's     Row Name 09/14/22 1400          Bed Mobility    Bed Mobility supine-sit  -SW     Supine-Sit [place] (Bed Mobility) independent  -     Row Name 09/14/22 1400          Transfers    Transfers sit-stand transfer;toilet transfer  -     Sit-Stand [place] (Transfers) independent  -     [place] Level (Toilet Transfer) independent  -     Row Name 09/14/22 1400          Toilet Transfer    Type (Toilet Transfer) sit-stand;stand-sit  -[facility] Name 09/14/22 1400          Functional Mobility    Functional Mobility- Ind. Level independent  -     Functional Mobility- Device other (see comments)  no device  -     Functional Mobility-Distance (Feet) 30  -SW     Row Name 09/14/22 1400          Activities of Daily Living    BADL Assessment/Intervention toileting;lower body dressing  -[facility] Name 09/14/22 1400          Toileting Assessment/Training    [place] Level (Toileting) toileting skills;perform perineal hygiene;independent  -     Position (Toileting) unsupported sitting  -[facility] Name 09/14/22 1400          Lower Body Dressing Assessment/Training    [place] Level (Lower Body Dressing) lower body dressing skills;shoes/slippers;independent  -     Position (Lower Body Dressing) edge of bed sitting  -           User Key  (r) = Recorded By, (t) = Taken By, (c) = Cosigned By    Initials Name Provider Type    Radha Linares OT Occupational Therapist               Obj/Interventions     Row Name 09/14/22 1400          Sensory Assessment (Somatosensory)     Sensory Assessment (Somatosensory) UE sensation intact  -SW     Row Name 09/14/22 1400          Vision Assessment/Intervention    Visual Impairment/Limitations WFL  -SW     Row Name 09/14/22 1400          Range of Motion Comprehensive    General Range of Motion bilateral upper extremity ROM L  -SW     Row Name 09/14/22 1400          Strength Comprehensive (MMT)    General Manual Muscle Testing (MMT) Assessment no strength deficits identified  -     Comment, General Manual Muscle Testing (MMT) Assessment BUEs 5/5  -SW     Row Name 09/14/22 1400          Balance    Balance Assessment sitting static balance;sitting dynamic balance;sit to stand dynamic balance;standing static balance;standing dynamic balance  -     Static Sitting Balance independent  -     Dynamic Sitting Balance independent  -SW     Position, Sitting Balance sitting edge of bed  -     Sit to Stand Dynamic Balance independent  -SW     Static Standing Balance independent  -SW     Dynamic Standing Balance modified independence  -SW     Position/Device Used, Standing Balance unsupported  -SW     Balance Interventions sitting;standing;sit to stand;supported;static;dynamic;minimal challenge;occupation based/functional task;manual resistance applied during activity  -SW           User Key  (r) = Recorded By, (t) = Taken By, (c) = Cosigned By    Initials Name Provider Type     Radha Newby OT Occupational Therapist               Goals/Plan     Row Name 09/14/22 1400          Bed Mobility Goal 1 (OT)    Activity/Assistive Device (Bed Mobility Goal 1, OT) sit to supine  -SW     [place] Level/Cues Needed (Bed Mobility Goal 1, OT) independent  -SW     Time Frame (Bed Mobility Goal 1, OT) long term goal (LTG);by discharge  -     Progress/Outcomes (Bed Mobility Goal 1, OT) goal met  -SW     Row Name 09/14/22 1400          Transfer Goal 1 (OT)    Activity/Assistive Device (Transfer Goal 1, OT) toilet  -SW     [place] Level/Cues Needed  (Transfer Goal 1, OT) independent  -SW     Time Frame (Transfer Goal 1, OT) long term goal (LTG);by discharge  -     Progress/Outcome (Transfer Goal 1, OT) goal met  -[facility] Name 09/14/22 1400          Therapy Assessment/Plan (OT)    Planned Therapy Interventions (OT) activity tolerance training;patient/caregiver education/training;functional balance retraining  -           User Key  (r) = Recorded By, (t) = Taken By, (c) = Cosigned By    Initials Name Provider Type    Radha Linares OT Occupational Therapist               Clinical Impression     [facility] Name 09/14/22 1400          Pain Assessment    Pretreatment Pain Rating 4/10  -SW     Posttreatment Pain Rating 4/10  -SW     Pain Location generalized  -     Pain Location - head  -SW     Pre/Posttreatment Pain Comment Pt says its because he hasn't ate yet.  -     Pain Intervention(s) Food  -[facility] Name 09/14/22 1400          Plan of Care Review    Plan of Care Reviewed With patient;spouse  -     Outcome Evaluation OT evaluation only.  Pt ox4 and wife present.  He completed dyn and static stdg tasks and amb to bath room with no ae.  Pt completed lbd, toileting, and bed mob with indep.  He was unsteady x 1 with dyn task but able to correct balance indep.  No further OT services indicated at this time.  -[facility] Name 09/14/22 1400          Therapy Assessment/Plan (OT)    Rehab Potential (OT) good, to achieve stated therapy goals  -     Criteria for Skilled Therapeutic Interventions Met (OT) no problems identified which require skilled intervention  -     Therapy Frequency (OT) evaluation only  -[facility] Name 09/14/22 1400          Therapy Plan Review/Discharge Plan (OT)    Anticipated Discharge Disposition (OT) home  -[facility] Name 09/14/22 1400          Vital Signs    Pre Systolic BP Rehab 131  -SW     Pre Treatment Diastolic BP 69  -SW     Post Systolic BP Rehab 155  -SW     Post Treatment Diastolic   -SW     O2 Delivery Pre  Treatment room air  -SW     O2 Delivery Intra Treatment room air  -SW     O2 Delivery Post Treatment room air  -SW     Pre Patient Position Supine  -SW     Intra Patient Position Standing  -SW     Post Patient Position Sitting  -SW     Row Name 09/14/22 1400          Positioning and Restraints    Pre-Treatment Position in bed  -SW     Post Treatment Position bed  -SW     In Bed notified nsg;sitting;sitting EOB;call light within reach;encouraged to call for assist;with family/caregiver;side rails up x2  -SW           User Key  (r) = Recorded By, (t) = Taken By, (c) = Cosigned By    Initials Name Provider Type    Radha Linares OT Occupational Therapist               Outcome Measures     Row Name 09/14/22 1436          How much help from another is currently needed...    Putting on and taking off regular lower body clothing? 4  -SW     Bathing (including washing, rinsing, and drying) 3  -SW     Toileting (which includes using toilet bed pan or urinal) 4  -SW     Putting on and taking off regular upper body clothing 4  -SW     Taking care of personal grooming (such as brushing teeth) 4  -SW     Eating meals 4  -SW     AM-PAC 6 Clicks Score (OT) 23  -SW     Row Name 09/14/22 1436          Functional Assessment    Outcome Measure Options AM-PAC 6 Clicks Daily Activity (OT)  -SW           User Key  (r) = Recorded By, (t) = Taken By, (c) = Cosigned By    Initials Name Provider Type    Radha Linares OT Occupational Therapist              Occupational Therapy Education                 Title: PT OT SLP Therapies (In Progress)     Topic: Occupational Therapy (In Progress)     Point: ADL training (Done)     Description:   Instruct learner(s) on proper safety adaptation and remediation techniques during self care or transfers.   Instruct in proper use of assistive devices.              Learning Progress Summary           Patient Acceptance, E, VU by HOOD at 9/14/2022 1437   Family Acceptance, E, VU by HOOD at 9/14/2022 1437                    Point: Home exercise program (Not Started)     Description:   Instruct learner(s) on appropriate technique for monitoring, assisting and/or progressing therapeutic exercises/activities.              Learner Progress:  Not documented in this visit.          Point: Precautions (Done)     Description:   Instruct learner(s) on prescribed precautions during self-care and functional transfers.              Learning Progress Summary           Patient Acceptance, E, VU by  at 9/14/2022 1437   Family Acceptance, E, VU by  at 9/14/2022 1437                   Point: Body mechanics (Not Started)     Description:   Instruct learner(s) on proper positioning and spine alignment during self-care, functional mobility activities and/or exercises.              Learner Progress:  Not documented in this visit.                      User Key     Initials Effective Dates Name Provider Type Discipline     06/16/21 -  Radha Newby OT Occupational Therapist OT              OT Recommendation and Plan  Planned Therapy Interventions (OT): activity tolerance training, patient/caregiver education/training, functional balance retraining  Therapy Frequency (OT): evaluation only  Plan of Care Review  Plan of Care Reviewed With: patient, spouse  Outcome Evaluation: OT evaluation only.  Pt ox4 and wife present.  He completed dyn and static stdg tasks and amb to bath room with no ae.  Pt completed lbd, toileting, and bed mob with indep.  He was unsteady x 1 with dyn task but able to correct balance indep.  No further OT services indicated at this time.  Plan of Care Reviewed With: patient, spouse  Outcome Evaluation: OT evaluation only.  Pt ox4 and wife present.  He completed dyn and static stdg tasks and amb to bath room with no ae.  Pt completed lbd, toileting, and bed mob with indep.  He was unsteady x 1 with dyn task but able to correct balance indep.  No further OT services indicated at this time.     Time Calculation:    Time  Calculation- OT     Row Name 09/14/22 1400             Time Calculation- OT    OT Start Time 1400  -SW      OT Received On 09/14/22  -SW      OT Goal Re-Cert Due Date 09/14/22  -SW              Timed Charges    90870 - OT Self Care/Mgmt Minutes 15  -SW              Untimed Charges    OT Eval/Re-eval Minutes 38  -SW              Total Minutes    Timed Charges Total Minutes 15  -SW      Untimed Charges Total Minutes 38  -SW       Total Minutes 53  -SW            User Key  (r) = Recorded By, (t) = Taken By, (c) = Cosigned By    Initials Name Provider Type     Radha Newby OT Occupational Therapist              Therapy Charges for Today     Code Description Service Date Service Provider Modifiers Qty    26741173271 HC OT EVAL LOW COMPLEXITY 3 9/14/2022 Radha Newby OT GO 1    29546953540 HC OT SELF CARE/MGMT/TRAIN EA 15 MIN 9/14/2022 Radha Newby OT GO 1             OT Discharge Summary  Anticipated Discharge Disposition (OT): home    Radha Newby OT  9/14/2022

## 2022-09-14 NOTE — CONSULTS
[facility] Consult    Referring Provider: Cristin Frank MD   PCP: Eze Hammond MD    Reason for Consultation: GI bleed with melena     Chief complaint: Weakness and fatigue     History of present illness:    Jaiden Burdick is a 57 y.o. male who is admitted with GI bleeding with melena and symptomatic anemia.   He complains of dyspnea on exertion and extreme fatigue.  He has developed black tarry stools for 1 week.   He had a syncopal episode two days ago.  He also states he has intermittent bright red blood per rectum for several years.   Previous colonoscopy was 3-5 years ago and reportedly unremarkable.       He has heavy use of Ibuprofen.   He has coronary artery disease and underwent recent stenting in [place], [place] with cardiologist Dr. Bautista.   He is currently on aspirin and Effient.      Allergies:  Oxycodone    Scheduled Meds:  amLODIPine, 5 mg, Oral, Daily  bisoprolol, 5 mg, Oral, Daily  busPIRone, 30 mg, Oral, BID  pantoprazole, 40 mg, Intravenous, Q12H  PARoxetine, 40 mg, Oral, Daily  sodium chloride, 10 mL, Intravenous, Q12H       Infusions:       PRN Meds:  •  acetaminophen  •  ondansetron **OR** ondansetron  •  sodium chloride    Home Meds:  Medications Prior to Admission   Medication Sig Dispense Refill Last Dose   • amLODIPine (NORVASC) 5 MG tablet Take 5 mg by mouth Daily.   9/12/2022 at Unknown time   • Aspirin Adult Low Strength 81 MG EC tablet    9/12/2022 at Unknown time   • atorvastatin (LIPITOR) 80 MG tablet Take 80 mg by mouth every night at bedtime.   9/12/2022 at Unknown time   • azaTHIOprine (IMURAN) 50 MG tablet TAKE 2 TABLETS BY MOUTH TWICE A  tablet 2 9/12/2022 at Unknown time   • bisoprolol (ZEBeta) 5 MG tablet Take 5 mg by mouth Daily.   9/12/2022 at Unknown time   • busPIRone (BUSPAR) 30 MG tablet Take 30 mg by mouth 2 (Two) Times a Day.   9/13/2022 at Unknown time   • diclofenac (VOLTAREN) 75 MG EC tablet diclofenac sodium 75 mg tablet,delayed  release   9/13/2022 at Unknown time   • pantoprazole (PROTONIX) 40 MG EC tablet Take 40 mg by mouth Daily.   9/12/2022 at Unknown time   • PARoxetine (PAXIL) 40 MG tablet Take 40 mg by mouth Daily.   9/12/2022 at Unknown time   • prasugrel (EFFIENT) 10 MG tablet Take 10 mg by mouth Daily.   9/12/2022 at Unknown time   • predniSONE (DELTASONE) 20 MG tablet Take 20 mg by mouth Daily.   9/12/2022 at Unknown time   • valACYclovir (VALTREX) 1000 MG tablet Take 1,000 mg by mouth As Needed.   More than a month at Unknown time       ROS: Review of Systems   Constitutional: Positive for fatigue.   HENT: Negative.    Eyes: Negative.    Respiratory: Positive for shortness of breath.    Cardiovascular: Negative.    Gastrointestinal: Positive for blood in stool.        Dark stools for 1 week  Intermittent bright red blood per rectum for several years    Endocrine: Negative.    Genitourinary: Negative.    Musculoskeletal: Negative.    Skin: Negative.    Neurological: Positive for dizziness, syncope and weakness.   Hematological: Negative.    Psychiatric/Behavioral: Negative.        PAST MED HX:  Past Medical History:   Diagnosis Date   • Coronary artery disease 2022   • Depression    • Difficulty walking 2020   • Headache, tension-type 2010   • HL (hearing loss) 2005   • Hyperlipidemia 2022   • Hypertension 2020   • Memory loss 2020   • Migraine 2020    COVID   • Movement disorder 2020   • Sleep apnea 2008   • Vision loss 2020       PAST SURG HX:  Past Surgical History:   Procedure Laterality Date   • CAROTID STENT  2022   • COLONOSCOPY  2016    [facility] [place]   • VASCULAR SURGERY  2022       FAM HX:  Family History   Problem Relation Age of Onset   • Heart disease Father    • Arthritis Sister    • Heart disease Sister    • Hypertension Sister    • Hyperlipidemia Sister    • Migraines Sister    • Neuropathy Sister        SOC HX:  Social History     Socioeconomic History   • Marital status: Single   Tobacco Use   • Smoking status: Never  "Smoker   • Smokeless tobacco: Never Used   Vaping Use   • Vaping Use: Never used   Substance and Sexual Activity   • Alcohol use: Never   • Drug use: Never   • Sexual activity: Yes     Partners: Female       PHYSICAL EXAM  /60 (BP Location: Right arm, Patient Position: Lying)   Pulse 60   Temp 97.5 °F (36.4 °C) (Oral)   Resp 18   Ht 188 cm (74\")   Wt 127 kg (279 lb 12.8 oz)   SpO2 95%   BMI 35.92 kg/m²   Wt Readings from Last 3 Encounters:   09/13/22 127 kg (279 lb 12.8 oz)   09/12/22 130 kg (286 lb)   08/15/22 130 kg (286 lb)   ,body mass index is 35.92 kg/m².  Physical Exam  Constitutional:       General: He is not in acute distress.     Appearance: He is obese.   HENT:      Head: Normocephalic and atraumatic.      Mouth/Throat:      Mouth: Mucous membranes are moist.   Eyes:      General: No scleral icterus.  Cardiovascular:      Rate and Rhythm: Normal rate and regular rhythm.   Pulmonary:      Effort: Pulmonary effort is normal.      Breath sounds: Normal breath sounds.   Abdominal:      General: Bowel sounds are normal. There is no distension.      Palpations: Abdomen is soft.      Tenderness: There is no abdominal tenderness.      Comments: Obese abdomen   Musculoskeletal:      Right lower leg: No edema.      Left lower leg: No edema.   Skin:     General: Skin is warm and dry.   Neurological:      Mental Status: He is alert and oriented to person, place, and time.   Psychiatric:         Behavior: Behavior normal.         Thought Content: Thought content normal.       Results Review:   I reviewed the patient's new clinical results.    Lab Results   Component Value Date    WBC 6.72 09/14/2022    HGB 8.8 (L) 09/14/2022    HGB 8.6 (L) 09/14/2022    HGB 8.3 (L) 09/14/2022    HCT 27.8 (L) 09/14/2022    MCV 84.7 09/14/2022     09/14/2022       No results found for: INR    Lab Results   Component Value Date    GLUCOSE 70 09/14/2022    BUN 17 09/14/2022    CREATININE 0.80 09/14/2022    EGFRIFNONA " 62 11/23/2021    BCR 21.3 09/14/2022     09/14/2022    K 4.4 09/14/2022    CO2 24.0 09/14/2022    CALCIUM 8.7 09/14/2022    ALBUMIN 3.30 (L) 09/14/2022    ALKPHOS 237 (H) 09/14/2022    BILITOT 1.7 (H) 09/14/2022     (H) 09/14/2022     (H) 09/14/2022      Latest Reference Range & Units 09/13/22 22:33   Hep A IgM Non-Reactive  Non-Reactive   Hepatitis B Surface Ag Non-Reactive  Non-Reactive   Hep B Core IgM Non-Reactive  Non-Reactive   Hepatitis C Ab Non-Reactive  Non-Reactive     ASSESSMENTS/PLANS    1. Acute gastrointestinal bleeding with melena   2. Acute blood loss anemia, symptomatic with syncope and dyspnea.  S/p transfusion of 2 units of PRBCs  3. Elevated LFTs  4. NSAID use  5. CAD s/p recent coronary stents, on aspirin and Effient  6. Obesity, BMI is 35    Suspect upper GI source of bleeding.   He has risk factors for peptic ulcer disease with significant ibuprofen use.        >>> Recommend EGD today  >>> IV Protonix 40 mg BID  >>> Obtain liver ultrasound   >>> Continue 81 mg aspirin.  Effient was held this morning.       I discussed the patient's findings and my recommendations with patient    MELLISA Dorantes  09/14/22  09:09 EDT

## 2022-09-14 NOTE — DISCHARGE SUMMARY
[facility] Medicine Services  DISCHARGE SUMMARY    Patient Name: Jaiden Burdick  : 1965  MRN: [id number]    Date of Admission: 2022 11:13 AM  Date of Discharge:  22  Primary Care Physician: Eze Hammond MD    Consults     Date and Time Order Name Status Description    2022  3:45 PM Inpatient Gastroenterology Consult Completed           Hospital Course     Presenting Problem:   Upper GI bleed [K92.2]    Active Hospital Problems    Diagnosis  POA   • CAD (coronary artery disease) [I25.10]  Yes   • Essential hypertension [I10]  Yes   • Hyperlipidemia [E78.5]  Yes   • Myasthenia gravis without exacerbation (HCC) [G70.00]  Yes      Resolved Hospital Problems    Diagnosis Date Resolved POA   • **Upper GI bleed [K92.2] 2022 Yes   • Symptomatic anemia [D64.9] 2022 Yes   • Hyponatremia [E87.1] 2022 Yes          Hospital Course:  Jaiden Burdick is a 57 y.o. male with hx of myasthenia gravis followed by Dr. Garza, HTN, HLD, CAD s/p recent stent in [place] 5-6 months ago by Dr. Bautista and started on ASA and Effient at that time, GERD, and depression who presents due to melena and symptomatic anemia. Hb 6.1 on arrival to ER. Admitted for further management.     Acute upper GI bleed  Symptomatic anemia  --Hb 6.1 in the ER. Was 7.8 in 2022 and 15.7 in 2022. Multiple risk factors for GI bleed including ASA, Effient, Diclofenac (NSAID), prednisone.  --Positive FOBT.  --Held ASA and Effient on admission.  --Held Diclofenac on admission.  --B12 and folate WNL.  --Iron level low at 15.  --Hb improved to 8.8 following 2 units PRBC transfusion.  --Consulted GI. Dr. Brunner performed EGD today which showed a large 15 mm deeply excavated ulcer in the prepyloric stomach with pigment spots, no blood seen. Biopsy taken for H.pylori. He has discussed with patient's cardiologist, recommends hold Effient at discharge, continue on ASA 81 mg daily,  Protonix 40 mg BID x 1 month and then daily thereafter. Stop Diclofenac, avoid all NSAID's. Will need surveillance EGD in 8-12 weeks to assess for healing of ulcer.     CAD s/p stent 5-6 months ago  --Had drug eluting stent placed by Dr. Bautista in [place], [place]/[facility].   --Dr. Brunner has discussed with Dr. Bautista, will stop Effient at discharge and continue on ASA 81 mg daily only.   --Continue Bisoprolol.     Elevated LFT's  Hx of fatty liver disease  --New compared to previous labs. Patient reports he has fatty liver.   --Negative acute hepatitis panel.  --RUQ ultrasound to be done prior to discharge.  --Hold statin at discharge.  --Repeat CMP in 1 week as outpatient.      HTN  HLD  --Continue home Norvasc, Bisoprolol.  --Hold statin due to elevated LFT's.     Myasthenia gravis  --On Imuran and prednisone as outpatient. Follows with Dr. Garza, just seen in office 9/12/22. Resume meds at discharge.     Depression  --Continue home Buspar, Paxil.     GERD  --Continue PPI.      Discharge Follow Up Recommendations for outpatient labs/diagnostics:  F/U with PCP in 1 week with repeat CMP  F/U with GI in 8-12 weeks for repeat EGD  F/U with your cardiologist as instructed    Day of Discharge     HPI:   Patient seen this morning, complains of feeling hungry and has a headache from not eating. No other complaints.     Review of Systems  Gen-no fevers, no chills  CV-no chest pain, no palpitations  Resp-no cough, no dyspnea  GI-no N/V/D, no abd pain    All other systems reviewed and negative except any additional pertinent positives and negatives as discussed in HPI.    Vital Signs:   Temp:  [96.4 °F (35.8 °C)-98.4 °F (36.9 °C)] 97 °F (36.1 °C)  Heart Rate:  [52-77] 70  Resp:  [14-20] 16  BP: (113-150)/(52-96) 119/71      Physical Exam:  Gen-no acute distress  HENT-NCAT, mucous membranes moist  CV-RRR, S1 S2 normal, no m/r/g  Resp-CTAB, no wheezes or rales  Abd-soft, NT, ND, +BS  Ext-no edema  Neuro-A&Ox3, no  focal deficits  Skin-no rashes  Psych-appropriate mood      Pertinent  and/or Most Recent Results     LAB RESULTS:      Lab 09/14/22  0751 09/14/22  0440 09/14/22  0012 09/13/22  1150 09/12/22  1356   WBC  --  6.72  --  8.62 10.27   HEMOGLOBIN 8.8* 8.6* 8.3* 6.1* 6.5*   HEMATOCRIT 27.8* 30.0* 26.5* 21.1* 22.9*   PLATELETS  --  348  --  372 481*   NEUTROS ABS  --   --   --  5.81 9.59*   IMMATURE GRANS (ABS)  --   --   --  0.03 0.05   LYMPHS ABS  --   --   --  2.06 0.41*   MONOS ABS  --   --   --  0.66 0.21   EOS ABS  --   --   --  0.05 0.00   MCV  --  84.7  --  82.1 81.5         Lab 09/14/22 0440 09/13/22  1150 09/12/22  1356   SODIUM 139 133* 140   POTASSIUM 4.4 3.5 4.8   CHLORIDE 103 99 105   CO2 24.0 24.0 24.8   ANION GAP 12.0 10.0 10.2   BUN 17 29* 27*   CREATININE 0.80 0.81 1.17   EGFR 102.6 102.2 72.3   GLUCOSE 70 85 151*   CALCIUM 8.7 8.3* 8.6   TSH  --   --  1.250         Lab 09/14/22  0440 09/13/22  1150 09/12/22  1356   TOTAL PROTEIN 5.8* 5.7* 6.0   ALBUMIN 3.30* 3.40* 3.70   GLOBULIN 2.5 2.3 2.3   ALT (SGPT) 140* 108* 100*   AST (SGOT) 131* 101* 126*   BILIRUBIN 1.7* 0.4 0.4   ALK PHOS 237* 251* 251*         Lab 09/13/22  1150   PROBNP 206.9   TROPONIN T <0.010             Lab 09/13/22  2233 09/13/22  1325 09/13/22  1150   IRON  --   --  15*   IRON SATURATION  --   --  3*   TIBC  --   --  437   TRANSFERRIN  --   --  293   FOLATE 12.10  --   --    VITAMIN B 12 760  --   --    ABO TYPING  --  A A   RH TYPING  --  Negative Negative   ANTIBODY SCREEN  --   --  Negative         Brief Urine Lab Results     None        Microbiology Results (last 10 days)     ** No results found for the last 240 hours. **          No radiology results for the last 10 days          Pending Labs     Order Current Status    Tissue Pathology Exam Collected (09/14/22 4065)        Discharge Details        Discharge Medications      Changes to Medications      Instructions Start Date   pantoprazole 40 MG EC tablet  Commonly known as:  Protonix  What changed:   · when to take this  · additional instructions   40 mg, Oral, 2 Times Daily, Take twice daily for 30 days, then can resume once daily dosing         Continue These Medications      Instructions Start Date   amLODIPine 5 MG tablet  Commonly known as: NORVASC   5 mg, Oral, Daily      Aspirin Adult Low Strength 81 MG EC tablet  Generic drug: aspirin   No dose, route, or frequency recorded.      azaTHIOprine 50 MG tablet  Commonly known as: IMURAN   TAKE 2 TABLETS BY MOUTH TWICE A DAY      bisoprolol 5 MG tablet  Commonly known as: ZEBeta   5 mg, Oral, Daily      busPIRone 30 MG tablet  Commonly known as: BUSPAR   30 mg, Oral, 2 Times Daily      PARoxetine 40 MG tablet  Commonly known as: PAXIL   40 mg, Oral, Daily      predniSONE 20 MG tablet  Commonly known as: DELTASONE   20 mg, Oral, Daily      valACYclovir 1000 MG tablet  Commonly known as: VALTREX   1,000 mg, Oral, As Needed         Stop These Medications    atorvastatin 80 MG tablet  Commonly known as: LIPITOR     diclofenac 75 MG EC tablet  Commonly known as: VOLTAREN     prasugrel 10 MG tablet  Commonly known as: EFFIENT            Allergies   Allergen Reactions   • Oxycodone Swelling     This is from when he was 11 years old.          Discharge Disposition:  Home or Self Care    Diet:  Hospital:  Diet Order   Procedures   • Diet Regular; Cardiac       Activity:  Activity Instructions     Activity as Tolerated              CODE STATUS:    Code Status and Medical Interventions:   Ordered at: 09/13/22 1415     Code Status (Patient has no pulse and is not breathing):    CPR (Attempt to Resuscitate)     Medical Interventions (Patient has pulse or is breathing):    Full Support       Future Appointments   Date Time Provider Department Center   11/21/2022 10:15 AM Franco Garza MD [facility]   2/16/2023  1:45 PM Franco Garza MD [facility]       Additional Instructions for the Follow-ups that You Need to Schedule      Discharge Follow-up with PCP   As directed       Currently Documented PCP:    Eze Hammond MD    PCP Phone Number:    [phone]     Follow Up Details: 1 week                     Cristin Frank MD  09/14/22      Time Spent on Discharge:  I spent  34 minutes on this discharge activity which included: face-to-face encounter with the patient, reviewing the data in the system, coordination of the care with the nursing staff as well as consultants, documentation, and entering orders.

## 2022-09-14 NOTE — DISCHARGE INSTRUCTIONS
Please have repeat lab work (CMP) done in 1 week with your PCP to monitor your liver function tests. Recommend holding atorvastatin/Lipitor until cleared by PCP to resume, as this can raise your liver function tests.

## 2022-09-14 NOTE — BRIEF OP NOTE
ESOPHAGOGASTRODUODENOSCOPY  Progress Note    Jaiden Burdick  9/14/2022    EGD reveals normal esophagus and duodenum.  There is a large 15 mm deeply excavated ulcer in the prepyloric stomach with pigment spots.  No blood seen.      >> Await H. pylori biopsy.  >> Twice daily PPI for 1 month, then daily.  >> Stop NSAIDs (ibuprofen, Voltaren).  >> Hold Effient for 1 month, but continue 81 mg aspirin for drug-eluting stent placed 6 months ago.  >> Patient needs surveillance EGD in 8 to 12 weeks to assess ulcer healing.    Anticipate home tomorrow.      Mark I. Brunner, MD     Date: 9/14/2022  Time: 13:11 EDT

## 2022-09-14 NOTE — PLAN OF CARE
Goal Outcome Evaluation:  Plan of Care Reviewed With: patient, spouse           Outcome Evaluation: OT evaluation only.  Pt ox4 and wife present.  He completed dyn and static stdg tasks and amb to bath room with no ae.  Pt completed lbd, toileting, and bed mob with indep.  He was unsteady x 1 with dyn task but able to correct balance indep.  No further OT services indicated at this time.

## 2022-09-15 LAB
CYTO UR: NORMAL
LAB AP CASE REPORT: NORMAL
LAB AP CLINICAL INFORMATION: NORMAL
PATH REPORT.FINAL DX SPEC: NORMAL
PATH REPORT.GROSS SPEC: NORMAL

## 2022-09-16 ENCOUNTER — TELEPHONE (OUTPATIENT)
Dept: NEUROLOGY | Facility: CLINIC | Age: 57
End: 2022-09-16

## 2022-09-16 NOTE — TELEPHONE ENCOUNTER
Notified Jaiden Garza/HOWIE wants him to decrease Prednisone 20mg to 10mg d/t large ulcer.   He states understanding. He is starting to feel a little better, Still no strength or stamina but hopefully will continue to improve.

## 2022-09-23 ENCOUNTER — TELEPHONE (OUTPATIENT)
Dept: NEUROLOGY | Facility: CLINIC | Age: 57
End: 2022-09-23

## 2022-09-23 NOTE — TELEPHONE ENCOUNTER
Provider: DILLAN  Caller: WISAM   Relationship to Patient: [employer], [employer]  Phone Number: [phone]  Reason for Call: PATIENT RECEIVED A DENIAL LETTER IN LATE July FOR ULTOMIRIS.  WISAM IS WANTING TO KNOW IF THIS IS STILL BEING PURSUED.   OKAY TO LVM.  IT COULD HAVE BEEN DENIED DUE TO INSURANCE.  WISAM MAY NOT HAVE HIS UPDATED INSURANCE INFO.     PLEASE REVIEW AND ADVISE     THANK YOU

## 2022-11-09 ENCOUNTER — OUTSIDE FACILITY SERVICE (OUTPATIENT)
Dept: GASTROENTEROLOGY | Facility: CLINIC | Age: 57
End: 2022-11-09

## 2022-11-09 PROCEDURE — 88305 TISSUE EXAM BY PATHOLOGIST: CPT

## 2022-11-09 PROCEDURE — 43239 EGD BIOPSY SINGLE/MULTIPLE: CPT | Performed by: INTERNAL MEDICINE

## 2022-11-09 RX ORDER — PANTOPRAZOLE SODIUM 40 MG/1
TABLET, DELAYED RELEASE ORAL
Qty: 90 TABLET | Refills: 3 | Status: SHIPPED | OUTPATIENT
Start: 2022-11-09 | End: 2022-11-11 | Stop reason: SDUPTHER

## 2022-11-10 ENCOUNTER — TELEPHONE (OUTPATIENT)
Dept: GASTROENTEROLOGY | Facility: CLINIC | Age: 57
End: 2022-11-10

## 2022-11-10 ENCOUNTER — LAB REQUISITION (OUTPATIENT)
Dept: LAB | Facility: HOSPITAL | Age: 57
End: 2022-11-10

## 2022-11-10 DIAGNOSIS — K25.9 GASTRIC ULCER, UNSPECIFIED AS ACUTE OR CHRONIC, WITHOUT HEMORRHAGE OR PERFORATION: ICD-10-CM

## 2022-11-10 DIAGNOSIS — K31.89 OTHER DISEASES OF STOMACH AND DUODENUM: ICD-10-CM

## 2022-11-10 DIAGNOSIS — K25.0 ACUTE GASTRIC ULCER WITH HEMORRHAGE: ICD-10-CM

## 2022-11-10 NOTE — TELEPHONE ENCOUNTER
,   Wife called pantoprazole was sent in for once a day and shes states that you told him to take BID. Please advise ?

## 2022-11-11 DIAGNOSIS — K21.9 GASTROESOPHAGEAL REFLUX DISEASE WITHOUT ESOPHAGITIS: Primary | ICD-10-CM

## 2022-11-11 RX ORDER — PANTOPRAZOLE SODIUM 40 MG/1
40 TABLET, DELAYED RELEASE ORAL 2 TIMES DAILY
Qty: 60 TABLET | Refills: 2 | Status: SHIPPED | OUTPATIENT
Start: 2022-11-11 | End: 2023-01-16

## 2022-11-15 ENCOUNTER — PRIOR AUTHORIZATION (OUTPATIENT)
Dept: GASTROENTEROLOGY | Facility: CLINIC | Age: 57
End: 2022-11-15

## 2022-11-15 LAB — REF LAB TEST METHOD: NORMAL

## 2022-11-21 ENCOUNTER — LAB (OUTPATIENT)
Dept: LAB | Facility: HOSPITAL | Age: 57
End: 2022-11-21

## 2022-11-21 ENCOUNTER — OFFICE VISIT (OUTPATIENT)
Dept: NEUROLOGY | Facility: CLINIC | Age: 57
End: 2022-11-21

## 2022-11-21 VITALS
HEART RATE: 69 BPM | SYSTOLIC BLOOD PRESSURE: 122 MMHG | DIASTOLIC BLOOD PRESSURE: 76 MMHG | HEIGHT: 74 IN | OXYGEN SATURATION: 99 % | BODY MASS INDEX: 35.81 KG/M2 | WEIGHT: 279 LBS

## 2022-11-21 DIAGNOSIS — G70.00 MYASTHENIA GRAVIS WITHOUT EXACERBATION: Primary | Chronic | ICD-10-CM

## 2022-11-21 DIAGNOSIS — G70.00 MYASTHENIA GRAVIS WITHOUT EXACERBATION: ICD-10-CM

## 2022-11-21 LAB
ALBUMIN SERPL-MCNC: 3.9 G/DL (ref 3.5–5.2)
ALBUMIN/GLOB SERPL: 1.7 G/DL
ALP SERPL-CCNC: 100 U/L (ref 39–117)
ALT SERPL W P-5'-P-CCNC: 25 U/L (ref 1–41)
ANION GAP SERPL CALCULATED.3IONS-SCNC: 12.6 MMOL/L (ref 5–15)
AST SERPL-CCNC: 36 U/L (ref 1–40)
BASOPHILS # BLD AUTO: 0.03 10*3/MM3 (ref 0–0.2)
BASOPHILS NFR BLD AUTO: 0.4 % (ref 0–1.5)
BILIRUB SERPL-MCNC: 0.3 MG/DL (ref 0–1.2)
BUN SERPL-MCNC: 17 MG/DL (ref 6–20)
BUN/CREAT SERPL: 18.1 (ref 7–25)
CALCIUM SPEC-SCNC: 9.1 MG/DL (ref 8.6–10.5)
CHLORIDE SERPL-SCNC: 105 MMOL/L (ref 98–107)
CO2 SERPL-SCNC: 24.4 MMOL/L (ref 22–29)
CREAT SERPL-MCNC: 0.94 MG/DL (ref 0.76–1.27)
DEPRECATED RDW RBC AUTO: 49.5 FL (ref 37–54)
EGFRCR SERPLBLD CKD-EPI 2021: 94 ML/MIN/1.73
EOSINOPHIL # BLD AUTO: 0.02 10*3/MM3 (ref 0–0.4)
EOSINOPHIL NFR BLD AUTO: 0.3 % (ref 0.3–6.2)
ERYTHROCYTE [DISTWIDTH] IN BLOOD BY AUTOMATED COUNT: 18.3 % (ref 12.3–15.4)
GLOBULIN UR ELPH-MCNC: 2.3 GM/DL
GLUCOSE SERPL-MCNC: 109 MG/DL (ref 65–99)
HCT VFR BLD AUTO: 33 % (ref 37.5–51)
HGB BLD-MCNC: 10.3 G/DL (ref 13–17.7)
IMM GRANULOCYTES # BLD AUTO: 0.03 10*3/MM3 (ref 0–0.05)
IMM GRANULOCYTES NFR BLD AUTO: 0.4 % (ref 0–0.5)
LYMPHOCYTES # BLD AUTO: 0.89 10*3/MM3 (ref 0.7–3.1)
LYMPHOCYTES NFR BLD AUTO: 12.8 % (ref 19.6–45.3)
MCH RBC QN AUTO: 23.5 PG (ref 26.6–33)
MCHC RBC AUTO-ENTMCNC: 31.2 G/DL (ref 31.5–35.7)
MCV RBC AUTO: 75.2 FL (ref 79–97)
MONOCYTES # BLD AUTO: 0.39 10*3/MM3 (ref 0.1–0.9)
MONOCYTES NFR BLD AUTO: 5.6 % (ref 5–12)
NEUTROPHILS NFR BLD AUTO: 5.58 10*3/MM3 (ref 1.7–7)
NEUTROPHILS NFR BLD AUTO: 80.5 % (ref 42.7–76)
NRBC BLD AUTO-RTO: 0.1 /100 WBC (ref 0–0.2)
PLATELET # BLD AUTO: 368 10*3/MM3 (ref 140–450)
PMV BLD AUTO: 8.7 FL (ref 6–12)
POTASSIUM SERPL-SCNC: 4.4 MMOL/L (ref 3.5–5.2)
PROT SERPL-MCNC: 6.2 G/DL (ref 6–8.5)
RBC # BLD AUTO: 4.39 10*6/MM3 (ref 4.14–5.8)
SODIUM SERPL-SCNC: 142 MMOL/L (ref 136–145)
WBC NRBC COR # BLD: 6.94 10*3/MM3 (ref 3.4–10.8)

## 2022-11-21 PROCEDURE — 99214 OFFICE O/P EST MOD 30 MIN: CPT | Performed by: PSYCHIATRY & NEUROLOGY

## 2022-11-21 PROCEDURE — 80053 COMPREHEN METABOLIC PANEL: CPT

## 2022-11-21 PROCEDURE — 85025 COMPLETE CBC W/AUTO DIFF WBC: CPT

## 2022-11-21 PROCEDURE — 36415 COLL VENOUS BLD VENIPUNCTURE: CPT

## 2022-11-21 RX ORDER — PRASUGREL 10 MG/1
10 TABLET, FILM COATED ORAL DAILY
COMMUNITY
Start: 2022-09-17 | End: 2022-11-21

## 2022-11-21 RX ORDER — PREDNISONE 20 MG/1
20 TABLET ORAL DAILY
Qty: 90 TABLET | Refills: 2 | Status: SHIPPED | OUTPATIENT
Start: 2022-11-21 | End: 2023-02-23 | Stop reason: SDUPTHER

## 2022-11-21 RX ORDER — LISINOPRIL 10 MG/1
10 TABLET ORAL DAILY
COMMUNITY
Start: 2022-10-31

## 2022-11-21 RX ORDER — ATORVASTATIN CALCIUM 80 MG/1
80 TABLET, FILM COATED ORAL
COMMUNITY
Start: 2022-09-17

## 2022-11-21 RX ORDER — DICLOFENAC SODIUM 75 MG/1
TABLET, DELAYED RELEASE ORAL
COMMUNITY
Start: 2022-10-31 | End: 2022-11-21 | Stop reason: SINTOL

## 2022-11-21 NOTE — PROGRESS NOTES
"Chief Complaint  Myasthenia Gravis    Subjective        Jaiden Burdick presents to [facility] NEUROLOGY [facility]     History of Present Illness    57 y.o. male returns in follow up.  Last visit on 9/12/22 continued AZA, prednisone, orfered labs.    Admitted [facility] 9/13/22 - 9/14/22 for GI bleed.  Hgb 6.1.  Tx 2 U PRBC.  Large 15 mm ulcer prepyloric stomach.      AChR Ab binding 7.4; Blocking 44; modulating 37  MUSK negative     Stopped prednisone after discharge from hospital.  Restarted due to aching pains in muscle.      SOB with exertion.  Trouble carrying objects.       Using arms to get out of chair.     Rest periods to use arms    Rare choking.       MG ADL 6     Prednisone 20 mg daily     Objective   Vital Signs:  /76   Pulse 69   Ht 188 cm (74.02\")   Wt 127 kg (279 lb)   SpO2 99%   BMI 35.81 kg/m²   Estimated body mass index is 35.81 kg/m² as calculated from the following:    Height as of this encounter: 188 cm (74.02\").    Weight as of this encounter: 127 kg (279 lb).          Physical Exam  Eyes:      Extraocular Movements: EOM normal.      Pupils: Pupils are equal, round, and reactive to light.   Neurological:      Mental Status: He is oriented to person, place, and time.      Cranial Nerves: Cranial nerves 2-12 are intact.      Gait: Gait is intact.   Psychiatric:         Speech: Speech normal.          Neurologic Exam     Mental Status   Oriented to person, place, and time.   Speech: speech is normal   Level of consciousness: alert  Knowledge: good and consistent with education.   Normal comprehension.     Cranial Nerves   Cranial nerves II through XII intact.     CN II   Visual fields full to confrontation.   Visual acuity: normal  Right visual field deficit: none  Left visual field deficit: none     CN III, IV, VI   Pupils are equal, round, and reactive to light.  Extraocular motions are normal.   Nystagmus: none   Diplopia: none  Ophthalmoparesis: none  Upgaze: " normal  Downgaze: normal  Conjugate gaze: present    CN V   Facial sensation intact.   Right corneal reflex: normal  Left corneal reflex: normal    CN VII   Right facial weakness: none  Left facial weakness: none    CN VIII   Hearing: intact    CN IX, X   Palate: symmetric  Right gag reflex: normal  Left gag reflex: normal    CN XI   Right sternocleidomastoid strength: normal  Left sternocleidomastoid strength: normal    CN XII   Tongue: not atrophic  Fasciculations: absent  Tongue deviation: none    Motor Exam   Muscle bulk: normal  Overall muscle tone: normal    Strength   Strength 5/5 except as noted.   Right deltoid: 4/5  Left deltoid: 4/5  Right biceps: 4/5  Left biceps: 4/5  Right triceps: 4/5  Left triceps: 4/5  Right iliopsoas: 4/5  Left iliopsoas: 4/5  Right quadriceps: 4/5  Left quadriceps: 4/5  Right hamstrin/5  Left hamstrin/5    Sensory Exam   Light touch normal.     Gait, Coordination, and Reflexes     Gait  Gait: normal    Tremor   Resting tremor: absent  Intention tremor: absent  Action tremor: absent    Reflexes   Reflexes 2+ except as noted.      Result Review :  The following data was reviewed by: Franco Garza MD on 2022:  Common labs    Common Labs 22    1356 1356 1150 1150 0012 0440 0440 0751   Glucose 151 (A)   85   70    BUN 27 (A)   29 (A)   17    Creatinine 1.17   0.81   0.80    Sodium 140   133 (A)   139    Potassium 4.8   3.5   4.4    Chloride 105   99   103    Calcium 8.6   8.3 (A)   8.7    Albumin 3.70   3.40 (A)   3.30 (A)    Total Bilirubin 0.4   0.4   1.7 (A)    Alkaline Phosphatase 251 (A)   251 (A)   237 (A)    AST (SGOT) 126 (A)   101 (A)   131 (A)    ALT (SGPT) 100 (A)   108 (A)   140 (A)    WBC  10.27 8.62   6.72     Hemoglobin  6.5 (A) 6.1 (A)  8.3 (A) 8.6 (A)  8.8 (A)   Hematocrit  22.9 (A) 21.1 (A)  26.5 (A) 30.0 (A)  27.8 (A)   Platelets  481 (A) 372   348     (A) Abnormal value       Comments are  available for some flowsheets but are not being displayed.           Data reviewed: Recent hospitalization notes [facility]           Assessment and Plan   Diagnoses and all orders for this visit:    1. Myasthenia gravis without exacerbation (HCC) (Primary)  Assessment & Plan:  MG ADL 6    Continue Prednisone 20 mg daily and  mg BID     CBC,CMP     Orders:  -     CBC & Differential; Future  -     Comprehensive Metabolic Panel; Future    Other orders  -     predniSONE (DELTASONE) 20 MG tablet; Take 1 tablet by mouth Daily.  Dispense: 90 tablet; Refill: 2           Follow Up   No follow-ups on file.  Patient was given instructions and counseling regarding his condition or for health maintenance advice. Please see specific information pulled into the AVS if appropriate.

## 2023-01-05 ENCOUNTER — TELEPHONE (OUTPATIENT)
Dept: NEUROLOGY | Facility: CLINIC | Age: 58
End: 2023-01-05
Payer: MEDICAID

## 2023-01-05 DIAGNOSIS — M19.90 ARTHRITIS: Primary | ICD-10-CM

## 2023-01-05 NOTE — TELEPHONE ENCOUNTER
Provider: DILLAN  Caller: PAYAL JIN  Relationship to Patient: [employer]  Phone Number: [phone]  Reason for Call: PAYAL WOULD LIKE TO SPEAK TO DR. GRIMALDO'S NURSE REGARDING SOME PATIENT ISSUES.  PATIENT IS JUST SITTING IN A CHAIR ALL DAY, DOESN'T FEEL WELL, ISN'T WORKING. PAYAL THINKS HE IS DEPRESSED.      ALSO, PATIENT WAS TOLD THE OFFICE WAS GOING TO MAKE HIM AN APPT FOR A RHEUMATOLOGIST AND THEY HAVE NOT HEARD ANYTHING REGARDING THIS.      PLEASE REVIEW AND ADVISE     THANK YOU

## 2023-01-05 NOTE — TELEPHONE ENCOUNTER
Spoke with Kaleb. Patient is laying in bed or sitting in recliner all day. Patient is not wanting to do anything at all per Kaleb. Pt says he is having shortness of breath, fatigue and just does not have the drive to do anything. She feels like patient is depressed. Pt is having extreme shortness of breath of moving across the room.

## 2023-01-06 DIAGNOSIS — G70.00 MYASTHENIA GRAVIS WITHOUT EXACERBATION: Primary | ICD-10-CM

## 2023-01-06 NOTE — TELEPHONE ENCOUNTER
LVM letting Kaleb know that  wants Jaiden to get labs drawn to make sure it is not something metabolic.    25-Aug-2019 19:25

## 2023-01-16 DIAGNOSIS — K21.9 GASTROESOPHAGEAL REFLUX DISEASE WITHOUT ESOPHAGITIS: ICD-10-CM

## 2023-01-16 RX ORDER — PANTOPRAZOLE SODIUM 40 MG/1
40 TABLET, DELAYED RELEASE ORAL 2 TIMES DAILY
Qty: 60 TABLET | Refills: 0 | Status: SHIPPED | OUTPATIENT
Start: 2023-01-16 | End: 2023-02-13

## 2023-01-19 ENCOUNTER — LAB (OUTPATIENT)
Dept: LAB | Facility: HOSPITAL | Age: 58
End: 2023-01-19
Payer: MEDICAID

## 2023-01-19 DIAGNOSIS — G70.00 MYASTHENIA GRAVIS WITHOUT EXACERBATION: ICD-10-CM

## 2023-01-19 LAB
BASOPHILS # BLD AUTO: 0.06 10*3/MM3 (ref 0–0.2)
BASOPHILS NFR BLD AUTO: 0.6 % (ref 0–1.5)
DEPRECATED RDW RBC AUTO: 50.8 FL (ref 37–54)
EOSINOPHIL # BLD AUTO: 0.05 10*3/MM3 (ref 0–0.4)
EOSINOPHIL NFR BLD AUTO: 0.5 % (ref 0.3–6.2)
ERYTHROCYTE [DISTWIDTH] IN BLOOD BY AUTOMATED COUNT: 18.6 % (ref 12.3–15.4)
ERYTHROCYTE [SEDIMENTATION RATE] IN BLOOD: 20 MM/HR (ref 0–20)
HCT VFR BLD AUTO: 37.5 % (ref 37.5–51)
HGB BLD-MCNC: 12 G/DL (ref 13–17.7)
IMM GRANULOCYTES # BLD AUTO: 0.04 10*3/MM3 (ref 0–0.05)
IMM GRANULOCYTES NFR BLD AUTO: 0.4 % (ref 0–0.5)
LYMPHOCYTES # BLD AUTO: 1.7 10*3/MM3 (ref 0.7–3.1)
LYMPHOCYTES NFR BLD AUTO: 17.8 % (ref 19.6–45.3)
MCH RBC QN AUTO: 24.5 PG (ref 26.6–33)
MCHC RBC AUTO-ENTMCNC: 32 G/DL (ref 31.5–35.7)
MCV RBC AUTO: 76.5 FL (ref 79–97)
MONOCYTES # BLD AUTO: 0.69 10*3/MM3 (ref 0.1–0.9)
MONOCYTES NFR BLD AUTO: 7.2 % (ref 5–12)
NEUTROPHILS NFR BLD AUTO: 7 10*3/MM3 (ref 1.7–7)
NEUTROPHILS NFR BLD AUTO: 73.5 % (ref 42.7–76)
NRBC BLD AUTO-RTO: 0 /100 WBC (ref 0–0.2)
PLATELET # BLD AUTO: 393 10*3/MM3 (ref 140–450)
PMV BLD AUTO: 8.7 FL (ref 6–12)
RBC # BLD AUTO: 4.9 10*6/MM3 (ref 4.14–5.8)
WBC NRBC COR # BLD: 9.54 10*3/MM3 (ref 3.4–10.8)

## 2023-01-19 PROCEDURE — 80050 GENERAL HEALTH PANEL: CPT

## 2023-01-19 PROCEDURE — 82746 ASSAY OF FOLIC ACID SERUM: CPT

## 2023-01-19 PROCEDURE — 36415 COLL VENOUS BLD VENIPUNCTURE: CPT

## 2023-01-19 PROCEDURE — 82607 VITAMIN B-12: CPT

## 2023-01-19 PROCEDURE — 85652 RBC SED RATE AUTOMATED: CPT

## 2023-01-20 LAB
ALBUMIN SERPL-MCNC: 4.1 G/DL (ref 3.5–5.2)
ALBUMIN/GLOB SERPL: 1.9 G/DL
ALP SERPL-CCNC: 125 U/L (ref 39–117)
ALT SERPL W P-5'-P-CCNC: 60 U/L (ref 1–41)
ANION GAP SERPL CALCULATED.3IONS-SCNC: 11.5 MMOL/L (ref 5–15)
AST SERPL-CCNC: 46 U/L (ref 1–40)
BILIRUB SERPL-MCNC: 0.4 MG/DL (ref 0–1.2)
BUN SERPL-MCNC: 23 MG/DL (ref 6–20)
BUN/CREAT SERPL: 24.5 (ref 7–25)
CALCIUM SPEC-SCNC: 9.2 MG/DL (ref 8.6–10.5)
CHLORIDE SERPL-SCNC: 105 MMOL/L (ref 98–107)
CO2 SERPL-SCNC: 22.5 MMOL/L (ref 22–29)
CREAT SERPL-MCNC: 0.94 MG/DL (ref 0.76–1.27)
EGFRCR SERPLBLD CKD-EPI 2021: 94 ML/MIN/1.73
FOLATE SERPL-MCNC: 6.96 NG/ML (ref 4.78–24.2)
GLOBULIN UR ELPH-MCNC: 2.2 GM/DL
GLUCOSE SERPL-MCNC: 101 MG/DL (ref 65–99)
POTASSIUM SERPL-SCNC: 3.8 MMOL/L (ref 3.5–5.2)
PROT SERPL-MCNC: 6.3 G/DL (ref 6–8.5)
SODIUM SERPL-SCNC: 139 MMOL/L (ref 136–145)
TSH SERPL DL<=0.05 MIU/L-ACNC: 3.96 UIU/ML (ref 0.27–4.2)
VIT B12 BLD-MCNC: 611 PG/ML (ref 211–946)

## 2023-01-23 RX ORDER — AZATHIOPRINE 50 MG/1
100 TABLET ORAL 2 TIMES DAILY
Qty: 360 TABLET | Refills: 2 | Status: SHIPPED | OUTPATIENT
Start: 2023-01-23

## 2023-01-23 NOTE — TELEPHONE ENCOUNTER
Caller: PAYAL JIN    Relationship: Emergency Contact    Best call back number: [phone]    Requested Prescriptions:   Requested Prescriptions     Pending Prescriptions Disp Refills   • azaTHIOprine (IMURAN) 50 MG tablet 360 tablet 2     Sig: Take 2 tablets by mouth 2 (Two) Times a Day.        Pharmacy where request should be sent: [facility] [address] [place] [address] [facility]  - [phone]  - [phone]     Additional details provided by patient:     Does the patient have less than a 3 day supply:  [] Yes  [x] No    Would you like a call back once the refill request has been completed: [] Yes [x] No    If the office needs to give you a call back, can they leave a voicemail: [] Yes [x] No    Jame Love Rep   01/23/23 11:41 EST

## 2023-01-23 NOTE — TELEPHONE ENCOUNTER
Rx Refill Note  Requested Prescriptions     Pending Prescriptions Disp Refills   • azaTHIOprine (IMURAN) 50 MG tablet 360 tablet 2     Sig: Take 2 tablets by mouth 2 (Two) Times a Day.      Last filled: 05/09/2022 360 with 2 refills.  Last office visit with prescribing clinician: 11/21/2022      Next office visit with prescribing clinician: 2/16/2023     Sent in 360 with 2 refills.    Althea Campos MA  01/23/23, 11:43 EST

## 2023-02-13 DIAGNOSIS — K21.9 GASTROESOPHAGEAL REFLUX DISEASE WITHOUT ESOPHAGITIS: ICD-10-CM

## 2023-02-13 RX ORDER — PANTOPRAZOLE SODIUM 40 MG/1
40 TABLET, DELAYED RELEASE ORAL 2 TIMES DAILY
Qty: 180 TABLET | Refills: 3 | Status: SHIPPED | OUTPATIENT
Start: 2023-02-13

## 2023-02-23 ENCOUNTER — LAB (OUTPATIENT)
Dept: LAB | Facility: HOSPITAL | Age: 58
End: 2023-02-23
Payer: MEDICAID

## 2023-02-23 ENCOUNTER — OFFICE VISIT (OUTPATIENT)
Dept: NEUROLOGY | Facility: CLINIC | Age: 58
End: 2023-02-23
Payer: MEDICAID

## 2023-02-23 VITALS
DIASTOLIC BLOOD PRESSURE: 78 MMHG | SYSTOLIC BLOOD PRESSURE: 128 MMHG | BODY MASS INDEX: 35.94 KG/M2 | HEART RATE: 74 BPM | WEIGHT: 280 LBS | HEIGHT: 74 IN | OXYGEN SATURATION: 96 %

## 2023-02-23 DIAGNOSIS — G70.00 MYASTHENIA GRAVIS WITHOUT EXACERBATION: ICD-10-CM

## 2023-02-23 DIAGNOSIS — G70.00 MYASTHENIA GRAVIS WITHOUT EXACERBATION: Primary | Chronic | ICD-10-CM

## 2023-02-23 PROCEDURE — 99214 OFFICE O/P EST MOD 30 MIN: CPT | Performed by: PSYCHIATRY & NEUROLOGY

## 2023-02-23 PROCEDURE — 83036 HEMOGLOBIN GLYCOSYLATED A1C: CPT

## 2023-02-23 PROCEDURE — 80050 GENERAL HEALTH PANEL: CPT

## 2023-02-23 PROCEDURE — 82607 VITAMIN B-12: CPT

## 2023-02-23 PROCEDURE — 36415 COLL VENOUS BLD VENIPUNCTURE: CPT

## 2023-02-23 PROCEDURE — 82746 ASSAY OF FOLIC ACID SERUM: CPT

## 2023-02-23 RX ORDER — PREDNISONE 10 MG/1
15 TABLET ORAL DAILY
Qty: 45 TABLET | Refills: 2 | Status: SHIPPED | OUTPATIENT
Start: 2023-02-23 | End: 2023-05-24

## 2023-02-23 NOTE — PROGRESS NOTES
"Chief Complaint  Myasthenia Gravis    Subjective        Jaiden Burdick presents to [facility] NEUROLOG[facility]     History of Present Illness    57 y.o. male returns in follow up.  Last visit on 11/21/22 continued Prednisone, AZA,     Admitted [facility] 9/13/22 - 9/14/22 for GI bleed.  Hgb 6.1.  Tx 2 U PRBC.  Large 15 mm ulcer prepyloric stomach.       AChR Ab binding 7.4; Blocking 44; modulating 37  MUSK negative     SOB with exertion.  Trouble carrying objects.       Using arms to get out of chair.      Extra effort to use arms     Rare choking.       MG ADL 6      Prednisone 20 mg daily    Objective   Vital Signs:  /78   Pulse 74   Ht 188 cm (74.02\")   Wt 127 kg (280 lb)   SpO2 96%   BMI 35.93 kg/m²   Estimated body mass index is 35.93 kg/m² as calculated from the following:    Height as of this encounter: 188 cm (74.02\").    Weight as of this encounter: 127 kg (280 lb).             Physical Exam  Eyes:      Extraocular Movements: EOM normal.      Pupils: Pupils are equal, round, and reactive to light.   Neurological:      Mental Status: He is oriented to person, place, and time.      Cranial Nerves: Cranial nerves 2-12 are intact.      Motor: Motor strength is normal.      Gait: Gait is intact.   Psychiatric:         Speech: Speech normal.          Neurologic Exam     Mental Status   Oriented to person, place, and time.   Speech: speech is normal   Level of consciousness: alert  Knowledge: good and consistent with education.   Normal comprehension.     Cranial Nerves   Cranial nerves II through XII intact.     CN II   Visual fields full to confrontation.   Visual acuity: normal  Right visual field deficit: none  Left visual field deficit: none     CN III, IV, VI   Pupils are equal, round, and reactive to light.  Extraocular motions are normal.   Nystagmus: none   Diplopia: none  Ophthalmoparesis: none  Upgaze: normal  Downgaze: normal  Conjugate gaze: present    CN V   Facial sensation " intact.   Right corneal reflex: normal  Left corneal reflex: normal    CN VII   Right facial weakness: none  Left facial weakness: none    CN VIII   Hearing: intact    CN IX, X   Palate: symmetric  Right gag reflex: normal  Left gag reflex: normal    CN XI   Right sternocleidomastoid strength: normal  Left sternocleidomastoid strength: normal    CN XII   Tongue: not atrophic  Fasciculations: absent  Tongue deviation: none    Motor Exam   Muscle bulk: normal  Overall muscle tone: normal    Strength   Strength 5/5 throughout.     Sensory Exam   Light touch normal.     Gait, Coordination, and Reflexes     Gait  Gait: normal    Tremor   Resting tremor: absent  Intention tremor: absent  Action tremor: absent    Reflexes   Reflexes 2+ except as noted.      Result Review :  The following data was reviewed by: Franco Garza MD on 02/23/2023:  Common labs    Common Labs 9/14/22 9/14/22 9/14/22 9/14/22 11/21/22 11/21/22 1/19/23 1/19/23    0012 0440 0440 0751 1045 1045 1423 1423   Glucose   70  109 (A)   101 (A)   BUN   17  17   23 (A)   Creatinine   0.80  0.94   0.94   Sodium   139  142   139   Potassium   4.4  4.4   3.8   Chloride   103  105   105   Calcium   8.7  9.1   9.2   Albumin   3.30 (A)  3.90   4.1   Total Bilirubin   1.7 (A)  0.3   0.4   Alkaline Phosphatase   237 (A)  100   125 (A)   AST (SGOT)   131 (A)  36   46 (A)   ALT (SGPT)   140 (A)  25   60 (A)   WBC  6.72    6.94 9.54    Hemoglobin 8.3 (A) 8.6 (A)  8.8 (A)  10.3 (A) 12.0 (A)    Hematocrit 26.5 (A) 30.0 (A)  27.8 (A)  33.0 (A) 37.5    Platelets  348    368 393    (A) Abnormal value       Comments are available for some flowsheets but are not being displayed.                        Assessment and Plan   Diagnoses and all orders for this visit:    1. Myasthenia gravis without exacerbation (HCC) (Primary)  Assessment & Plan:  Decrease Prednisone 15 mg daily     Continue AZA     Orders:  -     CBC & Differential; Future  -     Comprehensive Metabolic Panel;  Future  -     Hemoglobin A1c; Future  -     TSH; Future  -     Vitamin B12 & Folate; Future    Other orders  -     predniSONE (DELTASONE) 10 MG tablet; Take 1.5 tablets by mouth Daily for 90 days.  Dispense: 45 tablet; Refill: 2           Follow Up   No follow-ups on file.  Patient was given instructions and counseling regarding his condition or for health maintenance advice. Please see specific information pulled into the AVS if appropriate.

## 2023-02-24 ENCOUNTER — TELEPHONE (OUTPATIENT)
Dept: NEUROLOGY | Facility: CLINIC | Age: 58
End: 2023-02-24
Payer: MEDICAID

## 2023-02-24 LAB
ALBUMIN SERPL-MCNC: 4 G/DL (ref 3.5–5.2)
ALBUMIN/GLOB SERPL: 1.3 G/DL
ALP SERPL-CCNC: 120 U/L (ref 39–117)
ALT SERPL W P-5'-P-CCNC: 61 U/L (ref 1–41)
ANION GAP SERPL CALCULATED.3IONS-SCNC: 12.2 MMOL/L (ref 5–15)
AST SERPL-CCNC: 49 U/L (ref 1–40)
BASOPHILS # BLD AUTO: 0.05 10*3/MM3 (ref 0–0.2)
BASOPHILS NFR BLD AUTO: 0.6 % (ref 0–1.5)
BILIRUB SERPL-MCNC: 0.5 MG/DL (ref 0–1.2)
BUN SERPL-MCNC: 22 MG/DL (ref 6–20)
BUN/CREAT SERPL: 21.8 (ref 7–25)
CALCIUM SPEC-SCNC: 9.6 MG/DL (ref 8.6–10.5)
CHLORIDE SERPL-SCNC: 103 MMOL/L (ref 98–107)
CO2 SERPL-SCNC: 25.8 MMOL/L (ref 22–29)
CREAT SERPL-MCNC: 1.01 MG/DL (ref 0.76–1.27)
DEPRECATED RDW RBC AUTO: 55.6 FL (ref 37–54)
EGFRCR SERPLBLD CKD-EPI 2021: 86.2 ML/MIN/1.73
EOSINOPHIL # BLD AUTO: 0.04 10*3/MM3 (ref 0–0.4)
EOSINOPHIL NFR BLD AUTO: 0.5 % (ref 0.3–6.2)
ERYTHROCYTE [DISTWIDTH] IN BLOOD BY AUTOMATED COUNT: 19.4 % (ref 12.3–15.4)
FOLATE SERPL-MCNC: 10.8 NG/ML (ref 4.78–24.2)
GLOBULIN UR ELPH-MCNC: 3.1 GM/DL
GLUCOSE SERPL-MCNC: 90 MG/DL (ref 65–99)
HBA1C MFR BLD: 6.2 % (ref 4.8–5.6)
HCT VFR BLD AUTO: 39.3 % (ref 37.5–51)
HGB BLD-MCNC: 12.8 G/DL (ref 13–17.7)
IMM GRANULOCYTES # BLD AUTO: 0.02 10*3/MM3 (ref 0–0.05)
IMM GRANULOCYTES NFR BLD AUTO: 0.2 % (ref 0–0.5)
LYMPHOCYTES # BLD AUTO: 1.58 10*3/MM3 (ref 0.7–3.1)
LYMPHOCYTES NFR BLD AUTO: 19.2 % (ref 19.6–45.3)
MCH RBC QN AUTO: 26.3 PG (ref 26.6–33)
MCHC RBC AUTO-ENTMCNC: 32.6 G/DL (ref 31.5–35.7)
MCV RBC AUTO: 80.9 FL (ref 79–97)
MONOCYTES # BLD AUTO: 0.72 10*3/MM3 (ref 0.1–0.9)
MONOCYTES NFR BLD AUTO: 8.7 % (ref 5–12)
NEUTROPHILS NFR BLD AUTO: 5.83 10*3/MM3 (ref 1.7–7)
NEUTROPHILS NFR BLD AUTO: 70.8 % (ref 42.7–76)
NRBC BLD AUTO-RTO: 0 /100 WBC (ref 0–0.2)
PLATELET # BLD AUTO: 371 10*3/MM3 (ref 140–450)
PMV BLD AUTO: 8.9 FL (ref 6–12)
POTASSIUM SERPL-SCNC: 4.1 MMOL/L (ref 3.5–5.2)
PROT SERPL-MCNC: 7.1 G/DL (ref 6–8.5)
RBC # BLD AUTO: 4.86 10*6/MM3 (ref 4.14–5.8)
SODIUM SERPL-SCNC: 141 MMOL/L (ref 136–145)
TSH SERPL DL<=0.05 MIU/L-ACNC: 3.64 UIU/ML (ref 0.27–4.2)
VIT B12 BLD-MCNC: 753 PG/ML (ref 211–946)
WBC NRBC COR # BLD: 8.24 10*3/MM3 (ref 3.4–10.8)

## 2023-02-24 NOTE — TELEPHONE ENCOUNTER
Notified patient and his wife yes he does want him to stop the amlodipine. She states appreciation.

## 2023-02-24 NOTE — TELEPHONE ENCOUNTER
Caller: DAVINPAYAL    Relationship: Emergency Contact    Best call back number: [phone]    What medications are you currently taking:   Current Outpatient Medications on File Prior to Visit   Medication Sig Dispense Refill   • amLODIPine (NORVASC) 5 MG tablet Take 5 mg by mouth Daily.     • Aspirin Adult Low Strength 81 MG EC tablet      • atorvastatin (LIPITOR) 80 MG tablet Take 80 mg by mouth every night at bedtime.     • azaTHIOprine (IMURAN) 50 MG tablet Take 2 tablets by mouth 2 (Two) Times a Day. 360 tablet 2   • bisoprolol (ZEBeta) 5 MG tablet Take 5 mg by mouth Daily.     • busPIRone (BUSPAR) 30 MG tablet Take 30 mg by mouth 2 (Two) Times a Day.     • lisinopril (PRINIVIL,ZESTRIL) 10 MG tablet Take 10 mg by mouth Daily.     • pantoprazole (PROTONIX) 40 MG EC tablet TAKE 1 TABLET BY MOUTH 2 (TWO) TIMES A DAY. 180 tablet 3   • PARoxetine (PAXIL) 40 MG tablet Take 40 mg by mouth Daily.     • predniSONE (DELTASONE) 10 MG tablet Take 1.5 tablets by mouth Daily for 90 days. 45 tablet 2   • valACYclovir (VALTREX) 1000 MG tablet Take 1,000 mg by mouth As Needed.       No current facility-administered medications on file prior to visit.        What are your concerns:   PT HAD AN OV W/DR GRIMALDO YESTERDAY, 2-23-23, AND HE TOLD HIS WIFE THAT DR GRIMALDO TOLD HIM TO STOP TAKING ONE OF HIS BLOOD PRESSURE MEDS (amLODIPine (NORVASC) 5 MG tablet).    PT'S WIFE FIXES THE PT'S RX PACK AND DIDN'T SEE THIS NOTED IN THE PT'S AFTER VISIT SUMMARY.    PLEASE CALL HER TO LET HER KNOW OF THE RX CHANGES MADE AT THAT LAST APPT.

## 2023-02-27 ENCOUNTER — TELEPHONE (OUTPATIENT)
Dept: NEUROLOGY | Facility: CLINIC | Age: 58
End: 2023-02-27
Payer: MEDICAID

## 2023-02-27 NOTE — TELEPHONE ENCOUNTER
----- Message from Franco Garza MD sent at 2/24/2023  7:38 AM EST -----  Notify pt labs are improving

## 2023-03-22 ENCOUNTER — TELEPHONE (OUTPATIENT)
Dept: NEUROLOGY | Facility: CLINIC | Age: 58
End: 2023-03-22
Payer: MEDICAID

## 2023-03-22 NOTE — TELEPHONE ENCOUNTER
PT SIGNIFICANT OTHER CALLING TO CHECK ON PAPERWORK THAT THEY ARE ASKING DR GRIMALDO TO COMPLETE AND FAX.    SHE IS SENDING ANOTHER FAX WITH THIS [facility] PAPERWORK TODAY.    PLEASE LET HER KNOW WHEN PAPERWORK IS RECEIVED AND COMPLETED.    THANK YOU

## 2023-03-22 NOTE — TELEPHONE ENCOUNTER
Called to let Kaleb know we did get the paperwork this time and it is completed. I have faxed and will email Kaleb a copy. Mailing original to address on file.

## 2023-05-01 ENCOUNTER — TELEPHONE (OUTPATIENT)
Dept: NEUROLOGY | Facility: CLINIC | Age: 58
End: 2023-05-01

## 2023-05-01 NOTE — TELEPHONE ENCOUNTER
Caller: Jaiden Burdick    Relationship: Self    Best call back number: [phone]    What was the call regarding: PT STATES HE DROPPED OFF SOME LOAN-RELATED PPW LAST WEEK OR THE WEEK PRIOR FOR DR. GRIMALDO TO FILL OUT. PT IS CALLING TO CHECK THE STATUS OF THIS PPW.    PT ASKS THAT HE BE NOTIFIED UPON IT'S COMPLETION AND WOULD LIKE TO RECEIVE THE PPW VIA X-IO, IF POSSIBLE. IF NOT, PT WILL  THE PPW IN OFFICE.    Do you require a callback: YES, PLEASE.    PLEASE REVIEW AND ADVISE.

## 2023-06-05 NOTE — TELEPHONE ENCOUNTER
Caller: Jaiden Burdick    Relationship: Self    Best call back number: [phone]    What was the call regarding: PT CALLED TO REQUEST REFILL OF MEDICATION, HOWEVER, PT DID NOT KNOW WHICH MEDICATION IT IS THAT HE NEEDS REFILLED; HE WAS JUST ADVISED BY PHARMACY TO CALL OUR OFFICE TO REQUEST MEDICATION REFILL. PT TO CALL PHARMACY AND CALL US BACK TO START REFILL REQUEST.    DOCUMENTING PER PROTOCOL.

## 2023-06-12 ENCOUNTER — LAB (OUTPATIENT)
Dept: LAB | Facility: HOSPITAL | Age: 58
End: 2023-06-12
Payer: MEDICAID

## 2023-06-12 ENCOUNTER — OFFICE VISIT (OUTPATIENT)
Dept: NEUROLOGY | Facility: CLINIC | Age: 58
End: 2023-06-12
Payer: MEDICAID

## 2023-06-12 VITALS
BODY MASS INDEX: 35.01 KG/M2 | DIASTOLIC BLOOD PRESSURE: 78 MMHG | HEART RATE: 58 BPM | HEIGHT: 74 IN | SYSTOLIC BLOOD PRESSURE: 128 MMHG | WEIGHT: 272.8 LBS | OXYGEN SATURATION: 99 %

## 2023-06-12 DIAGNOSIS — G70.00 MYASTHENIA GRAVIS WITHOUT EXACERBATION: ICD-10-CM

## 2023-06-12 DIAGNOSIS — G70.00 MYASTHENIA GRAVIS WITHOUT EXACERBATION: Primary | Chronic | ICD-10-CM

## 2023-06-12 LAB
ALBUMIN SERPL-MCNC: 3.8 G/DL (ref 3.5–5.2)
ALBUMIN/GLOB SERPL: 1.4 G/DL
ALP SERPL-CCNC: 106 U/L (ref 39–117)
ALT SERPL W P-5'-P-CCNC: 47 U/L (ref 1–41)
ANION GAP SERPL CALCULATED.3IONS-SCNC: 10.2 MMOL/L (ref 5–15)
AST SERPL-CCNC: 47 U/L (ref 1–40)
BASOPHILS # BLD AUTO: 0.03 10*3/MM3 (ref 0–0.2)
BASOPHILS NFR BLD AUTO: 0.3 % (ref 0–1.5)
BILIRUB SERPL-MCNC: 0.6 MG/DL (ref 0–1.2)
BUN SERPL-MCNC: 25 MG/DL (ref 6–20)
BUN/CREAT SERPL: 26.3 (ref 7–25)
CALCIUM SPEC-SCNC: 9.5 MG/DL (ref 8.6–10.5)
CHLORIDE SERPL-SCNC: 107 MMOL/L (ref 98–107)
CO2 SERPL-SCNC: 26.8 MMOL/L (ref 22–29)
CREAT SERPL-MCNC: 0.95 MG/DL (ref 0.76–1.27)
DEPRECATED RDW RBC AUTO: 48.8 FL (ref 37–54)
EGFRCR SERPLBLD CKD-EPI 2021: 92.8 ML/MIN/1.73
EOSINOPHIL # BLD AUTO: 0.02 10*3/MM3 (ref 0–0.4)
EOSINOPHIL NFR BLD AUTO: 0.2 % (ref 0.3–6.2)
ERYTHROCYTE [DISTWIDTH] IN BLOOD BY AUTOMATED COUNT: 16.1 % (ref 12.3–15.4)
GLOBULIN UR ELPH-MCNC: 2.8 GM/DL
GLUCOSE SERPL-MCNC: 114 MG/DL (ref 65–99)
HCT VFR BLD AUTO: 38.1 % (ref 37.5–51)
HGB BLD-MCNC: 12.5 G/DL (ref 13–17.7)
IMM GRANULOCYTES # BLD AUTO: 0.06 10*3/MM3 (ref 0–0.05)
IMM GRANULOCYTES NFR BLD AUTO: 0.6 % (ref 0–0.5)
LYMPHOCYTES # BLD AUTO: 0.78 10*3/MM3 (ref 0.7–3.1)
LYMPHOCYTES NFR BLD AUTO: 7.5 % (ref 19.6–45.3)
MCH RBC QN AUTO: 27.4 PG (ref 26.6–33)
MCHC RBC AUTO-ENTMCNC: 32.8 G/DL (ref 31.5–35.7)
MCV RBC AUTO: 83.6 FL (ref 79–97)
MONOCYTES # BLD AUTO: 0.55 10*3/MM3 (ref 0.1–0.9)
MONOCYTES NFR BLD AUTO: 5.3 % (ref 5–12)
NEUTROPHILS NFR BLD AUTO: 8.9 10*3/MM3 (ref 1.7–7)
NEUTROPHILS NFR BLD AUTO: 86.1 % (ref 42.7–76)
NRBC BLD AUTO-RTO: 0 /100 WBC (ref 0–0.2)
PLATELET # BLD AUTO: 309 10*3/MM3 (ref 140–450)
PMV BLD AUTO: 9.1 FL (ref 6–12)
POTASSIUM SERPL-SCNC: 4.5 MMOL/L (ref 3.5–5.2)
PROT SERPL-MCNC: 6.6 G/DL (ref 6–8.5)
RBC # BLD AUTO: 4.56 10*6/MM3 (ref 4.14–5.8)
SODIUM SERPL-SCNC: 144 MMOL/L (ref 136–145)
WBC NRBC COR # BLD: 10.34 10*3/MM3 (ref 3.4–10.8)

## 2023-06-12 PROCEDURE — 3078F DIAST BP <80 MM HG: CPT | Performed by: PSYCHIATRY & NEUROLOGY

## 2023-06-12 PROCEDURE — 1159F MED LIST DOCD IN RCRD: CPT | Performed by: PSYCHIATRY & NEUROLOGY

## 2023-06-12 PROCEDURE — 80053 COMPREHEN METABOLIC PANEL: CPT

## 2023-06-12 PROCEDURE — 1160F RVW MEDS BY RX/DR IN RCRD: CPT | Performed by: PSYCHIATRY & NEUROLOGY

## 2023-06-12 PROCEDURE — 85025 COMPLETE CBC W/AUTO DIFF WBC: CPT

## 2023-06-12 PROCEDURE — 3074F SYST BP LT 130 MM HG: CPT | Performed by: PSYCHIATRY & NEUROLOGY

## 2023-06-12 PROCEDURE — 36415 COLL VENOUS BLD VENIPUNCTURE: CPT

## 2023-06-12 PROCEDURE — 99214 OFFICE O/P EST MOD 30 MIN: CPT | Performed by: PSYCHIATRY & NEUROLOGY

## 2023-06-12 RX ORDER — SODIUM CHLORIDE 9 MG/ML
250 INJECTION, SOLUTION INTRAVENOUS ONCE
Status: DISCONTINUED | OUTPATIENT
Start: 2023-06-12 | End: 2023-06-12

## 2023-06-12 RX ORDER — CETIRIZINE HYDROCHLORIDE 10 MG/1
TABLET ORAL
COMMUNITY
Start: 2023-06-09

## 2023-06-12 RX ORDER — ACETAMINOPHEN 650 MG/1
TABLET, FILM COATED, EXTENDED RELEASE ORAL
COMMUNITY
Start: 2023-02-23

## 2023-06-12 RX ORDER — SODIUM CHLORIDE 9 MG/ML
250 INJECTION, SOLUTION INTRAVENOUS ONCE
OUTPATIENT
Start: 2023-06-12

## 2023-06-12 RX ORDER — SODIUM CHLORIDE 9 MG/ML
250 INJECTION, SOLUTION INTRAVENOUS ONCE
Status: CANCELLED | OUTPATIENT
Start: 2023-06-27

## 2023-06-12 NOTE — PROGRESS NOTES
"Chief Complaint  MYASTHENIA GRAVIS    Subjective        Jaiden Burdick presents to [facility] NEUROLOGY [facility]  History of Present Illness    57 y.o. male returns in follow up.  Last visit on 2/23/23  decreased prednisone 15 mg daily, continued AZA, ordered labs.     Carrying groceries is exhausted.  Can stand for 10 minutes.      MG ADL 7       AChR Ab binding 7.4; Blocking 44; modulating 37  MUSK negative     SOB with exertion.  Trouble carrying objects.       Using arms to get out of chair.      Extra effort to use arms     Rare choking.       MG ADL 6      Prednisone 15 mg daily    Objective   Vital Signs:  /78   Pulse 58   Ht 188 cm (74.02\")   Wt 124 kg (272 lb 12.8 oz)   SpO2 99%   BMI 35.01 kg/m²   Estimated body mass index is 35.01 kg/m² as calculated from the following:    Height as of this encounter: 188 cm (74.02\").    Weight as of this encounter: 124 kg (272 lb 12.8 oz).             Physical Exam  Eyes:      Extraocular Movements: EOM normal.      Pupils: Pupils are equal, round, and reactive to light.   Neurological:      Mental Status: He is oriented to person, place, and time.      Cranial Nerves: Cranial nerves 2-12 are intact.      Motor: Motor strength is normal.      Gait: Gait is intact.   Psychiatric:         Speech: Speech normal.        Neurologic Exam     Mental Status   Oriented to person, place, and time.   Speech: speech is normal   Level of consciousness: alert  Knowledge: good and consistent with education.   Normal comprehension.     Cranial Nerves   Cranial nerves II through XII intact.     CN II   Visual fields full to confrontation.   Visual acuity: normal  Right visual field deficit: none  Left visual field deficit: none     CN III, IV, VI   Pupils are equal, round, and reactive to light.  Extraocular motions are normal.   Nystagmus: none   Diplopia: none  Ophthalmoparesis: none  Upgaze: normal  Downgaze: normal  Conjugate gaze: present    CN V   Facial " sensation intact.   Right corneal reflex: normal  Left corneal reflex: normal    CN VII   Right facial weakness: none  Left facial weakness: none    CN VIII   Hearing: intact    CN IX, X   Palate: symmetric  Right gag reflex: normal  Left gag reflex: normal    CN XI   Right sternocleidomastoid strength: normal  Left sternocleidomastoid strength: normal    CN XII   Tongue: not atrophic  Fasciculations: absent  Tongue deviation: none    Motor Exam   Muscle bulk: normal  Overall muscle tone: normal    Strength   Strength 5/5 throughout.     Sensory Exam   Light touch normal.     Gait, Coordination, and Reflexes     Gait  Gait: normal    Tremor   Resting tremor: absent  Intention tremor: absent  Action tremor: absent    Reflexes   Reflexes 2+ except as noted.    Result Review :  The following data was reviewed by: Franco Garza MD on 06/12/2023:  Common labs          11/21/2022    10:45 1/19/2023    14:23 2/23/2023    12:04   Common Labs   Glucose 109  101  90    BUN 17  23  22    Creatinine 0.94  0.94  1.01    Sodium 142  139  141    Potassium 4.4  3.8  4.1    Chloride 105  105  103    Calcium 9.1  9.2  9.6    Albumin 3.90  4.1  4.0    Total Bilirubin 0.3  0.4  0.5    Alkaline Phosphatase 100  125  120    AST (SGOT) 36  46  49    ALT (SGPT) 25  60  61    WBC 6.94  9.54  8.24    Hemoglobin 10.3  12.0  12.8    Hematocrit 33.0  37.5  39.3    Platelets 368  393  371    Hemoglobin A1C   6.20                   Assessment and Plan   Diagnoses and all orders for this visit:    1. Myasthenia gravis without exacerbation (Primary)  Assessment & Plan:  Start Ultomaris     Continue Prednisone 15 mg daily, AZA     Orders:  -     Nursing communication  -     Nursing communication  -     Nursing communication  -     Nursing Communication  -     Discontinue: sodium chloride 0.9 % infusion 250 mL  -     Discontinue: ravulizumab-cwvz (ULTOMIRIS) 3,000 mg in sodium chloride 0.9 % 30 mL IVPB  -     CBC & Differential; Future  -      Comprehensive Metabolic Panel; Future    Other orders  -     Cancel: sodium chloride 0.9 % infusion 250 mL  -     Cancel: ravulizumab-cwvz (ULTOMIRIS) 3,000 mg in sodium chloride 0.9 % 30 mL IVPB  -     Cancel: ravulizumab-cwvz (ULTOMIRIS) 3,600 mg in sodium chloride 0.9 % 36 mL IVPB  -     sodium chloride 0.9 % infusion 250 mL  -     ravulizumab-cwvz (ULTOMIRIS) 3,000 mg in sodium chloride 0.9 % 30 mL IVPB  -     ravulizumab-cwvz (ULTOMIRIS) 3,600 mg in sodium chloride 0.9 % 36 mL IVPB             Follow Up   No follow-ups on file.  Patient was given instructions and counseling regarding his condition or for health maintenance advice. Please see specific information pulled into the AVS if appropriate.

## 2023-06-13 ENCOUNTER — TELEPHONE (OUTPATIENT)
Dept: NEUROLOGY | Facility: CLINIC | Age: 58
End: 2023-06-13
Payer: MEDICAID

## 2023-06-13 RX ORDER — PREDNISONE 10 MG/1
15 TABLET ORAL DAILY
Qty: 135 TABLET | Refills: 3 | Status: SHIPPED | OUTPATIENT
Start: 2023-06-13 | End: 2024-06-12

## 2023-06-13 NOTE — TELEPHONE ENCOUNTER
MEDICATION REFILL REQUEST    Caller: Yodit Burdicke    Relationship: Self    Best call back number: [phone]      Requested Prescriptions: predniSONE (DELTASONE) 10 MG tablet- Take 1.5 tablets by mouth daily (UNABLE TO ATTACH VIA SMARTLINK AS RX IS NOT LISTED AS ACTIVE IN CHART)    Pharmacy where request should be sent: [id number] - [place], [place] - [address]  - [phone]  - [phone]      Last office visit with prescribing clinician: 6/12/2023   Next office visit with prescribing clinician: 10/12/2023     Additional details provided by patient: PT REPORTS THAT HE IS COMPLETELY OUT OF THE MEDICATION AS OF THE TIME OF CALL.    Does the patient have less than a 3 day supply?:  [x] Yes  [] No    Would you like a call back once the refill request has been completed?: [] Yes  [x] No    If the office needs to give you a call back, can they leave a voicemail?: [] Yes  [x] No    PLEASE REVIEW AND ADVISE.    Jame Liriano Rep   06/13/23 10:26 EDT

## 2023-09-13 ENCOUNTER — TELEPHONE (OUTPATIENT)
Dept: NEUROLOGY | Facility: CLINIC | Age: 58
End: 2023-09-13

## 2023-09-13 NOTE — TELEPHONE ENCOUNTER
Provider: NIAK GRIMALDO MD    Caller: CARLIE    Relationship to Patient: [facility]    Phone Number: [phone]    Reason for Call: CALLING TO SEE IF PATIENTS VACCINES ARE UP TO DATE.  SHE IS WANTING TO KNOW IF PT REQUIRES FINANCIAL ASSISTANCE    PLEASE CALL & ADVISE

## 2023-09-15 NOTE — TELEPHONE ENCOUNTER
Provider: NIKA PAGAN MD    Caller: CARLIE    Relationship to Patient: [employer]    Phone Number: [phone]    Reason for Call: CALLING REGARDING THE MESSAGE.  STATED SHE NEEDS TO S/W SOMEONE IN THE CLINIC SO PATIENT CAN START INFUSIONS.   CALLED S/W HANANE AT CLINIC AND WARM TRANSFERRED

## 2023-09-19 ENCOUNTER — INFUSION (OUTPATIENT)
Dept: ONCOLOGY | Facility: HOSPITAL | Age: 58
End: 2023-09-19
Payer: MEDICAID

## 2023-09-19 VITALS
WEIGHT: 278.4 LBS | SYSTOLIC BLOOD PRESSURE: 130 MMHG | RESPIRATION RATE: 16 BRPM | HEART RATE: 60 BPM | TEMPERATURE: 97.6 F | BODY MASS INDEX: 35.73 KG/M2 | DIASTOLIC BLOOD PRESSURE: 75 MMHG

## 2023-09-19 DIAGNOSIS — G70.00 MYASTHENIA GRAVIS WITHOUT EXACERBATION: Primary | ICD-10-CM

## 2023-09-19 PROCEDURE — 96372 THER/PROPH/DIAG INJ SC/IM: CPT

## 2023-09-19 PROCEDURE — 25010000002 RAVULIZUMAB-CWVZ 1100 MG/11ML SOLUTION 3 ML VIAL: Performed by: PSYCHIATRY & NEUROLOGY

## 2023-09-19 PROCEDURE — 25010000002 MENINGOCOCCAL RECONSTITUTED SOLUTION: Performed by: PSYCHIATRY & NEUROLOGY

## 2023-09-19 PROCEDURE — 25010000002 RAVULIZUMAB-CWVZ 1100 MG/11ML SOLUTION 11 ML VIAL: Performed by: PSYCHIATRY & NEUROLOGY

## 2023-09-19 PROCEDURE — 96365 THER/PROPH/DIAG IV INF INIT: CPT

## 2023-09-19 PROCEDURE — 90734 MENACWYD/MENACWYCRM VACC IM: CPT | Performed by: PSYCHIATRY & NEUROLOGY

## 2023-09-19 PROCEDURE — 25010000002 MENINGOCOCCAL B SUSPENSION PREFILLED SYRINGE: Performed by: PSYCHIATRY & NEUROLOGY

## 2023-09-19 PROCEDURE — 90472 IMMUNIZATION ADMIN EACH ADD: CPT

## 2023-09-19 PROCEDURE — 90620 MENB-4C VACCINE IM: CPT | Performed by: PSYCHIATRY & NEUROLOGY

## 2023-09-19 PROCEDURE — 90471 IMMUNIZATION ADMIN: CPT

## 2023-09-19 RX ORDER — SODIUM CHLORIDE 9 MG/ML
250 INJECTION, SOLUTION INTRAVENOUS ONCE
Status: DISCONTINUED | OUTPATIENT
Start: 2023-09-19 | End: 2023-09-19 | Stop reason: HOSPADM

## 2023-09-19 RX ORDER — SODIUM CHLORIDE 9 MG/ML
250 INJECTION, SOLUTION INTRAVENOUS ONCE
OUTPATIENT
Start: 2023-10-04

## 2023-09-19 RX ADMIN — NEISSERIA MENINGITIDIS SEROGROUP B NHBA FUSION PROTEIN ANTIGEN, NEISSERIA MENINGITIDIS SEROGROUP B FHBP FUSION PROTEIN ANTIGEN AND NEISSERIA MENINGITIDIS SEROGROUP B NADA PROTEIN ANTIGEN 0.5 ML: 50; 50; 50; 25 INJECTION, SUSPENSION INTRAMUSCULAR at 09:47

## 2023-09-19 RX ADMIN — Medication 0.5 ML: at 09:45

## 2023-09-19 RX ADMIN — RAVULIZUMAB 3000 MG: 1100 SOLUTION, CONCENTRATE INTRAVENOUS at 09:51

## 2023-09-26 RX ORDER — AZATHIOPRINE 50 MG/1
100 TABLET ORAL 2 TIMES DAILY
Qty: 360 TABLET | Refills: 2 | Status: SHIPPED | OUTPATIENT
Start: 2023-09-26

## 2023-09-26 NOTE — TELEPHONE ENCOUNTER
Rx Refill Note  Requested Prescriptions     Pending Prescriptions Disp Refills    azaTHIOprine (IMURAN) 50 MG tablet 360 tablet 2     Sig: Take 2 tablets by mouth 2 (Two) Times a Day.      Last filled:  01/23/2023 w/2  Last office visit with prescribing clinician: 6/12/2023      Next office visit with prescribing clinician: 10/12/2023     Naty Huertas MA  09/26/23, 15:15 EDT

## 2023-10-03 ENCOUNTER — INFUSION (OUTPATIENT)
Dept: ONCOLOGY | Facility: HOSPITAL | Age: 58
End: 2023-10-03
Payer: MEDICAID

## 2023-10-03 VITALS
BODY MASS INDEX: 35.6 KG/M2 | SYSTOLIC BLOOD PRESSURE: 151 MMHG | DIASTOLIC BLOOD PRESSURE: 80 MMHG | RESPIRATION RATE: 16 BRPM | WEIGHT: 277.4 LBS | TEMPERATURE: 96.4 F | HEART RATE: 47 BPM

## 2023-10-03 DIAGNOSIS — G70.00 MYASTHENIA GRAVIS WITHOUT EXACERBATION: Primary | ICD-10-CM

## 2023-10-03 PROCEDURE — 25010000002 RAVULIZUMAB-CWVZ 1100 MG/11ML SOLUTION 11 ML VIAL: Performed by: PSYCHIATRY & NEUROLOGY

## 2023-10-03 PROCEDURE — 25010000002 RAVULIZUMAB-CWVZ 1100 MG/11ML SOLUTION 3 ML VIAL: Performed by: PSYCHIATRY & NEUROLOGY

## 2023-10-03 PROCEDURE — 96365 THER/PROPH/DIAG IV INF INIT: CPT

## 2023-10-03 RX ORDER — SODIUM CHLORIDE 9 MG/ML
250 INJECTION, SOLUTION INTRAVENOUS ONCE
OUTPATIENT
Start: 2023-10-04

## 2023-10-03 RX ADMIN — RAVULIZUMAB 3600 MG: 1100 SOLUTION, CONCENTRATE INTRAVENOUS at 09:50

## 2023-10-12 ENCOUNTER — OFFICE VISIT (OUTPATIENT)
Dept: NEUROLOGY | Facility: CLINIC | Age: 58
End: 2023-10-12
Payer: MEDICAID

## 2023-10-12 VITALS
HEART RATE: 57 BPM | SYSTOLIC BLOOD PRESSURE: 142 MMHG | BODY MASS INDEX: 35.42 KG/M2 | OXYGEN SATURATION: 95 % | DIASTOLIC BLOOD PRESSURE: 82 MMHG | HEIGHT: 74 IN | WEIGHT: 276 LBS

## 2023-10-12 DIAGNOSIS — G70.00 MYASTHENIA GRAVIS WITHOUT EXACERBATION: Primary | Chronic | ICD-10-CM

## 2023-10-12 PROCEDURE — 3079F DIAST BP 80-89 MM HG: CPT | Performed by: PSYCHIATRY & NEUROLOGY

## 2023-10-12 PROCEDURE — 1159F MED LIST DOCD IN RCRD: CPT | Performed by: PSYCHIATRY & NEUROLOGY

## 2023-10-12 PROCEDURE — 99214 OFFICE O/P EST MOD 30 MIN: CPT | Performed by: PSYCHIATRY & NEUROLOGY

## 2023-10-12 PROCEDURE — 1160F RVW MEDS BY RX/DR IN RCRD: CPT | Performed by: PSYCHIATRY & NEUROLOGY

## 2023-10-12 PROCEDURE — 3077F SYST BP >= 140 MM HG: CPT | Performed by: PSYCHIATRY & NEUROLOGY

## 2023-10-12 RX ORDER — PREDNISONE 10 MG/1
10 TABLET ORAL DAILY
Qty: 90 TABLET | Refills: 3 | Status: SHIPPED | OUTPATIENT
Start: 2023-10-12 | End: 2024-10-11

## 2023-10-12 RX ORDER — VALACYCLOVIR HYDROCHLORIDE 1 G/1
1000 TABLET, FILM COATED ORAL AS NEEDED
Qty: 30 TABLET | Refills: 5 | Status: SHIPPED | OUTPATIENT
Start: 2023-10-12

## 2023-10-12 RX ORDER — DICLOFENAC SODIUM 75 MG/1
TABLET, DELAYED RELEASE ORAL
COMMUNITY

## 2023-10-12 RX ORDER — VALSARTAN 160 MG/1
TABLET ORAL
COMMUNITY

## 2023-10-12 RX ORDER — PRAVASTATIN SODIUM 40 MG
TABLET ORAL
COMMUNITY

## 2023-10-12 NOTE — PROGRESS NOTES
"Chief Complaint    Myasthenia gravis    Subjective        Jaiden Burdick Jr. presents to [facility] NEUROLOGY    History of Present Illness    58 y.o. male returns in follow up.  Last visit on 2/23/23 decreased Prednisone, AZA, ordered labs.       6/12/23 ALT 47, AST 47 abs lymph 780     Last Ultomaris infusion 10/3/223 increased energy     MG ADL 7     Problem history:    Admitted [facility] 9/13/22 - 9/14/22 for GI bleed.  Hgb 6.1.  Tx 2 U PRBC.  Large 15 mm ulcer prepyloric stomach.       AChR Ab binding 7.4; Blocking 44; modulating 37  MUSK negative     SOB with exertion.  Trouble carrying objects.       Using arms to get out of chair.      Extra effort to use arms     Rare choking.       Prednisone 20 mg daily        Objective   Vital Signs:  /82   Pulse 57   Ht 188 cm (74.02\")   Wt 125 kg (276 lb)   SpO2 95%   BMI 35.42 kg/mý   Estimated body mass index is 35.42 kg/mý as calculated from the following:    Height as of this encounter: 188 cm (74.02\").    Weight as of this encounter: 125 kg (276 lb).           Neurologic Exam     Mental Status   Oriented to person, place, and time.   Speech: speech is normal   Level of consciousness: alert  Knowledge: good and consistent with education.   Normal comprehension.     Cranial Nerves   Cranial nerves II through XII intact.     CN II   Visual fields full to confrontation.   Visual acuity: normal  Right visual field deficit: none  Left visual field deficit: none     CN III, IV, VI   Pupils are equal, round, and reactive to light.  Extraocular motions are normal.   Nystagmus: none   Diplopia: none  Ophthalmoparesis: none  Upgaze: normal  Downgaze: normal  Conjugate gaze: present    CN V   Facial sensation intact.   Right corneal reflex: normal  Left corneal reflex: normal    CN VII   Right facial weakness: none  Left facial weakness: none    CN VIII   Hearing: intact    CN IX, X   Palate: symmetric  Right gag reflex: normal  Left gag reflex: " normal    CN XI   Right sternocleidomastoid strength: normal  Left sternocleidomastoid strength: normal    CN XII   Tongue: not atrophic  Fasciculations: absent  Tongue deviation: none    Motor Exam   Muscle bulk: normal  Overall muscle tone: normal    Strength   Strength 5/5 throughout.     Sensory Exam   Light touch normal.     Gait, Coordination, and Reflexes     Gait  Gait: normal    Tremor   Resting tremor: absent  Intention tremor: absent  Action tremor: absent    Reflexes   Reflexes 2+ except as noted.        Physical Exam  Eyes:      Extraocular Movements: EOM normal.      Pupils: Pupils are equal, round, and reactive to light.   Neurological:      Mental Status: He is oriented to person, place, and time.      Cranial Nerves: Cranial nerves 2-12 are intact.      Motor: Motor strength is normal.     Gait: Gait is intact.   Psychiatric:         Speech: Speech normal.        Result Review :  The following data was reviewed by: Franco Garza MD on 10/12/2023:  Common labs          1/19/2023    14:23 2/23/2023    12:04 6/12/2023    13:48   Common Labs   Glucose 101  90  114    BUN 23  22  25    Creatinine 0.94  1.01  0.95    Sodium 139  141  144    Potassium 3.8  4.1  4.5    Chloride 105  103  107    Calcium 9.2  9.6  9.5    Albumin 4.1  4.0  3.8    Total Bilirubin 0.4  0.5  0.6    Alkaline Phosphatase 125  120  106    AST (SGOT) 46  49  47    ALT (SGPT) 60  61  47    WBC 9.54  8.24  10.34    Hemoglobin 12.0  12.8  12.5    Hematocrit 37.5  39.3  38.1    Platelets 393  371  309    Hemoglobin A1C  6.20                    Assessment and Plan   Diagnoses and all orders for this visit:    1. Myasthenia gravis without exacerbation (Primary)  Assessment & Plan:  MG ADL 7     Continue Ultomaris, AZA     Decrease prednisone 10 mg daily        Orders:  -     CBC & Differential; Future  -     Comprehensive Metabolic Panel; Future    Other orders  -     predniSONE (DELTASONE) 10 MG tablet; Take 1 tablet by mouth Daily.   Dispense: 90 tablet; Refill: 3             Follow Up   No follow-ups on file.  Patient was given instructions and counseling regarding his condition or for health maintenance advice. Please see specific information pulled into the AVS if appropriate.

## 2023-11-01 ENCOUNTER — LAB (OUTPATIENT)
Dept: LAB | Facility: HOSPITAL | Age: 58
End: 2023-11-01
Payer: MEDICAID

## 2023-11-01 DIAGNOSIS — G70.00 MYASTHENIA GRAVIS WITHOUT EXACERBATION: ICD-10-CM

## 2023-11-01 LAB
BASOPHILS # BLD AUTO: 0.04 10*3/MM3 (ref 0–0.2)
BASOPHILS NFR BLD AUTO: 0.4 % (ref 0–1.5)
DEPRECATED RDW RBC AUTO: 48.1 FL (ref 37–54)
EOSINOPHIL # BLD AUTO: 0.03 10*3/MM3 (ref 0–0.4)
EOSINOPHIL NFR BLD AUTO: 0.3 % (ref 0.3–6.2)
ERYTHROCYTE [DISTWIDTH] IN BLOOD BY AUTOMATED COUNT: 14.5 % (ref 12.3–15.4)
HCT VFR BLD AUTO: 43 % (ref 37.5–51)
HGB BLD-MCNC: 14.4 G/DL (ref 13–17.7)
IMM GRANULOCYTES # BLD AUTO: 0.04 10*3/MM3 (ref 0–0.05)
IMM GRANULOCYTES NFR BLD AUTO: 0.4 % (ref 0–0.5)
LYMPHOCYTES # BLD AUTO: 1.05 10*3/MM3 (ref 0.7–3.1)
LYMPHOCYTES NFR BLD AUTO: 10.8 % (ref 19.6–45.3)
MCH RBC QN AUTO: 30.7 PG (ref 26.6–33)
MCHC RBC AUTO-ENTMCNC: 33.5 G/DL (ref 31.5–35.7)
MCV RBC AUTO: 91.7 FL (ref 79–97)
MONOCYTES # BLD AUTO: 0.65 10*3/MM3 (ref 0.1–0.9)
MONOCYTES NFR BLD AUTO: 6.7 % (ref 5–12)
NEUTROPHILS NFR BLD AUTO: 7.87 10*3/MM3 (ref 1.7–7)
NEUTROPHILS NFR BLD AUTO: 81.4 % (ref 42.7–76)
NRBC BLD AUTO-RTO: 0 /100 WBC (ref 0–0.2)
PLATELET # BLD AUTO: 373 10*3/MM3 (ref 140–450)
PMV BLD AUTO: 9.1 FL (ref 6–12)
RBC # BLD AUTO: 4.69 10*6/MM3 (ref 4.14–5.8)
WBC NRBC COR # BLD: 9.68 10*3/MM3 (ref 3.4–10.8)

## 2023-11-01 PROCEDURE — 80053 COMPREHEN METABOLIC PANEL: CPT

## 2023-11-01 PROCEDURE — 85025 COMPLETE CBC W/AUTO DIFF WBC: CPT

## 2023-11-01 PROCEDURE — 36415 COLL VENOUS BLD VENIPUNCTURE: CPT

## 2023-11-02 LAB
ALBUMIN SERPL-MCNC: 3.9 G/DL (ref 3.5–5.2)
ALBUMIN/GLOB SERPL: 1.2 G/DL
ALP SERPL-CCNC: 93 U/L (ref 39–117)
ALT SERPL W P-5'-P-CCNC: 20 U/L (ref 1–41)
ANION GAP SERPL CALCULATED.3IONS-SCNC: 12 MMOL/L (ref 5–15)
AST SERPL-CCNC: 27 U/L (ref 1–40)
BILIRUB SERPL-MCNC: 0.4 MG/DL (ref 0–1.2)
BUN SERPL-MCNC: 18 MG/DL (ref 6–20)
BUN/CREAT SERPL: 17.1 (ref 7–25)
CALCIUM SPEC-SCNC: 9.6 MG/DL (ref 8.6–10.5)
CHLORIDE SERPL-SCNC: 104 MMOL/L (ref 98–107)
CO2 SERPL-SCNC: 26 MMOL/L (ref 22–29)
CREAT SERPL-MCNC: 1.05 MG/DL (ref 0.76–1.27)
EGFRCR SERPLBLD CKD-EPI 2021: 81.8 ML/MIN/1.73
GLOBULIN UR ELPH-MCNC: 3.3 GM/DL
GLUCOSE SERPL-MCNC: 106 MG/DL (ref 65–99)
POTASSIUM SERPL-SCNC: 4.6 MMOL/L (ref 3.5–5.2)
PROT SERPL-MCNC: 7.2 G/DL (ref 6–8.5)
SODIUM SERPL-SCNC: 142 MMOL/L (ref 136–145)

## 2024-01-03 ENCOUNTER — INFUSION (OUTPATIENT)
Dept: ONCOLOGY | Facility: HOSPITAL | Age: 59
End: 2024-01-03
Payer: MEDICAID

## 2024-01-03 VITALS
HEART RATE: 90 BPM | RESPIRATION RATE: 18 BRPM | BODY MASS INDEX: 36.11 KG/M2 | SYSTOLIC BLOOD PRESSURE: 166 MMHG | TEMPERATURE: 97.6 F | WEIGHT: 281.4 LBS | DIASTOLIC BLOOD PRESSURE: 89 MMHG

## 2024-01-03 DIAGNOSIS — G70.00 MYASTHENIA GRAVIS WITHOUT EXACERBATION: Primary | ICD-10-CM

## 2024-01-03 PROCEDURE — 25010000002 RAVULIZUMAB-CWVZ 1100 MG/11ML SOLUTION 3 ML VIAL: Performed by: PSYCHIATRY & NEUROLOGY

## 2024-01-03 PROCEDURE — 25810000003 SODIUM CHLORIDE 0.9 % SOLUTION: Performed by: PSYCHIATRY & NEUROLOGY

## 2024-01-03 PROCEDURE — 96365 THER/PROPH/DIAG IV INF INIT: CPT

## 2024-01-03 PROCEDURE — 25010000002 RAVULIZUMAB-CWVZ 1100 MG/11ML SOLUTION 11 ML VIAL: Performed by: PSYCHIATRY & NEUROLOGY

## 2024-01-03 RX ORDER — SODIUM CHLORIDE 9 MG/ML
250 INJECTION, SOLUTION INTRAVENOUS ONCE
Status: COMPLETED | OUTPATIENT
Start: 2024-01-03 | End: 2024-01-03

## 2024-01-03 RX ORDER — SODIUM CHLORIDE 9 MG/ML
250 INJECTION, SOLUTION INTRAVENOUS ONCE
OUTPATIENT
Start: 2024-01-24

## 2024-01-03 RX ADMIN — RAVULIZUMAB 3600 MG: 1100 SOLUTION, CONCENTRATE INTRAVENOUS at 15:05

## 2024-01-03 RX ADMIN — SODIUM CHLORIDE 250 ML: 9 INJECTION, SOLUTION INTRAVENOUS at 15:42

## 2024-01-11 ENCOUNTER — TELEPHONE (OUTPATIENT)
Dept: NEUROLOGY | Facility: CLINIC | Age: 59
End: 2024-01-11
Payer: MEDICAID

## 2024-02-13 ENCOUNTER — OFFICE VISIT (OUTPATIENT)
Dept: NEUROLOGY | Facility: CLINIC | Age: 59
End: 2024-02-13
Payer: MEDICAID

## 2024-02-13 VITALS
WEIGHT: 281 LBS | SYSTOLIC BLOOD PRESSURE: 128 MMHG | HEART RATE: 55 BPM | HEIGHT: 74 IN | DIASTOLIC BLOOD PRESSURE: 80 MMHG | OXYGEN SATURATION: 97 % | BODY MASS INDEX: 36.06 KG/M2

## 2024-02-13 DIAGNOSIS — G70.00 MYASTHENIA GRAVIS WITHOUT EXACERBATION: Primary | Chronic | ICD-10-CM

## 2024-02-13 PROCEDURE — 3079F DIAST BP 80-89 MM HG: CPT | Performed by: PSYCHIATRY & NEUROLOGY

## 2024-02-13 PROCEDURE — 3074F SYST BP LT 130 MM HG: CPT | Performed by: PSYCHIATRY & NEUROLOGY

## 2024-02-13 PROCEDURE — 99214 OFFICE O/P EST MOD 30 MIN: CPT | Performed by: PSYCHIATRY & NEUROLOGY

## 2024-02-28 ENCOUNTER — INFUSION (OUTPATIENT)
Dept: ONCOLOGY | Facility: HOSPITAL | Age: 59
End: 2024-02-28
Payer: MEDICAID

## 2024-02-28 VITALS
WEIGHT: 283.6 LBS | TEMPERATURE: 95.1 F | RESPIRATION RATE: 18 BRPM | DIASTOLIC BLOOD PRESSURE: 90 MMHG | BODY MASS INDEX: 36.4 KG/M2 | HEART RATE: 106 BPM | SYSTOLIC BLOOD PRESSURE: 146 MMHG

## 2024-02-28 DIAGNOSIS — G70.00 MYASTHENIA GRAVIS WITHOUT EXACERBATION: Primary | ICD-10-CM

## 2024-02-28 PROCEDURE — 96365 THER/PROPH/DIAG IV INF INIT: CPT

## 2024-02-28 PROCEDURE — 25810000003 SODIUM CHLORIDE 0.9 % SOLUTION: Performed by: PSYCHIATRY & NEUROLOGY

## 2024-02-28 PROCEDURE — 25010000002 RAVULIZUMAB-CWVZ 1100 MG/11ML SOLUTION 3 ML VIAL: Performed by: PSYCHIATRY & NEUROLOGY

## 2024-02-28 PROCEDURE — 25010000002 RAVULIZUMAB-CWVZ 1100 MG/11ML SOLUTION 11 ML VIAL: Performed by: PSYCHIATRY & NEUROLOGY

## 2024-02-28 RX ORDER — SODIUM CHLORIDE 9 MG/ML
250 INJECTION, SOLUTION INTRAVENOUS ONCE
Status: COMPLETED | OUTPATIENT
Start: 2024-02-28 | End: 2024-02-28

## 2024-02-28 RX ORDER — SODIUM CHLORIDE 9 MG/ML
250 INJECTION, SOLUTION INTRAVENOUS ONCE
OUTPATIENT
Start: 2024-04-24

## 2024-02-28 RX ADMIN — SODIUM CHLORIDE 250 ML: 9 INJECTION, SOLUTION INTRAVENOUS at 11:13

## 2024-02-28 RX ADMIN — RAVULIZUMAB 3600 MG: 1100 SOLUTION, CONCENTRATE INTRAVENOUS at 11:13

## 2024-04-24 ENCOUNTER — INFUSION (OUTPATIENT)
Dept: ONCOLOGY | Facility: HOSPITAL | Age: 59
End: 2024-04-24
Payer: MEDICAID

## 2024-04-24 VITALS
HEART RATE: 59 BPM | WEIGHT: 287.4 LBS | RESPIRATION RATE: 18 BRPM | BODY MASS INDEX: 36.88 KG/M2 | SYSTOLIC BLOOD PRESSURE: 120 MMHG | DIASTOLIC BLOOD PRESSURE: 70 MMHG | TEMPERATURE: 96 F

## 2024-04-24 DIAGNOSIS — G70.00 MYASTHENIA GRAVIS WITHOUT EXACERBATION: Primary | ICD-10-CM

## 2024-04-24 PROCEDURE — 25010000002 RAVULIZUMAB-CWVZ 1100 MG/11ML SOLUTION 3 ML VIAL: Performed by: PSYCHIATRY & NEUROLOGY

## 2024-04-24 PROCEDURE — 25010000002 RAVULIZUMAB-CWVZ 1100 MG/11ML SOLUTION 11 ML VIAL: Performed by: PSYCHIATRY & NEUROLOGY

## 2024-04-24 PROCEDURE — 25810000003 SODIUM CHLORIDE 0.9 % SOLUTION: Performed by: PSYCHIATRY & NEUROLOGY

## 2024-04-24 PROCEDURE — 96365 THER/PROPH/DIAG IV INF INIT: CPT

## 2024-04-24 RX ORDER — SODIUM CHLORIDE 9 MG/ML
250 INJECTION, SOLUTION INTRAVENOUS ONCE
Status: COMPLETED | OUTPATIENT
Start: 2024-04-24 | End: 2024-04-24

## 2024-04-24 RX ORDER — SODIUM CHLORIDE 9 MG/ML
250 INJECTION, SOLUTION INTRAVENOUS ONCE
OUTPATIENT
Start: 2024-06-19

## 2024-04-24 RX ADMIN — SODIUM CHLORIDE 250 ML: 9 INJECTION, SOLUTION INTRAVENOUS at 13:31

## 2024-04-24 RX ADMIN — RAVULIZUMAB 3600 MG: 1100 SOLUTION, CONCENTRATE INTRAVENOUS at 13:31

## 2024-07-22 ENCOUNTER — TELEPHONE (OUTPATIENT)
Dept: NEUROLOGY | Facility: CLINIC | Age: 59
End: 2024-07-22
Payer: MEDICAID

## 2024-07-22 NOTE — TELEPHONE ENCOUNTER
Spoke to Mr Gennaro to inform that I am faxing the form today and will put it on Yuliya's desk so that he can pick the original up at his next appointment.  He expressed appreciation.

## 2024-07-22 NOTE — TELEPHONE ENCOUNTER
Provider: DILLAN    Caller: NAE    Relationship to Patient: SELF    Phone Number: [phone]    Reason for Call: PATIENT IS REQUESTING A CALL BACK FOR AN UPDATE ON INSURANCE FORMS THAT HE DROPPED OFF TWO WEEKS AGO. PATIENT IS REQUESTING FOR FORMS TO BE RE-SENT.

## 2024-08-01 ENCOUNTER — TELEPHONE (OUTPATIENT)
Dept: NEUROLOGY | Facility: CLINIC | Age: 59
End: 2024-08-01
Payer: MEDICAID

## 2024-08-01 NOTE — TELEPHONE ENCOUNTER
Caller: Jaiden Burdick Jr.    Relationship: Self    Best call back number: [phone]*    What was the call regarding: PATIENT WAS CONTACTED BY [facility] [employer] REGARDING HIS INFUSION AND MEDICATIONS. THEY TOLD HIM THEY WOULD SUPPLY UNTIL THE BEGINNING OF THE YEAR. THE PATIENT IS UNSURE IF THIS A SCAM OR NOT AND WOULD LIKE TO CHECK WITH DR GRIMALDO.     PATIENT IS ALSO NEEDING TO KNOW ABOUT THE COST OF THE INFUSION PORTION. IF HE WAS RESPONSIBLE OR IF THERE WAS SOMETHING DIFFERENT.     PLEASE REVIEW  THANK YOU

## 2024-08-01 NOTE — TELEPHONE ENCOUNTER
Spoke with patient explained typically infusion charges are left up to the patient but I will have Tanesha Gongora reach out to him to help answer questions.

## 2024-08-02 ENCOUNTER — TELEPHONE (OUTPATIENT)
Dept: NEUROLOGY | Facility: CLINIC | Age: 59
End: 2024-08-02
Payer: MEDICAID

## 2024-08-02 RX ORDER — PREDNISONE 10 MG/1
10 TABLET ORAL DAILY
Qty: 90 TABLET | Refills: 3 | Status: SHIPPED | OUTPATIENT
Start: 2024-08-02 | End: 2025-08-02

## 2024-08-02 RX ORDER — AZATHIOPRINE 50 MG/1
100 TABLET ORAL 2 TIMES DAILY
Qty: 360 TABLET | Refills: 2 | Status: SHIPPED | OUTPATIENT
Start: 2024-08-02

## 2024-08-02 NOTE — TELEPHONE ENCOUNTER
}    Medication requested (name and dose):      predniSONE (DELTASONE) 10 MG tablet   Take 1 tablet by mouth Daily., Starting Thu 10/12/2023, Until Fri 10/11/2024,     azaTHIOprine (IMURAN) 50 MG tablet   shila 2 tablets by mouth 2 (Two) Times a Day., Starting Tue 9/26/2023     Pharmacy where request should be sent:      [facility] [address] [phone] [facility] [phone] FX      Additional details provided by patient:      Best call back number:  [phone]    Does the patient have less than a 3 day supply:  [] Yes  [x] No    Jame Milner Rep  08/02/24, 13:15 EDT

## 2024-09-18 ENCOUNTER — TELEPHONE (OUTPATIENT)
Dept: NEUROLOGY | Facility: CLINIC | Age: 59
End: 2024-09-18
Payer: MEDICAID

## 2024-10-03 ENCOUNTER — TELEPHONE (OUTPATIENT)
Dept: NEUROLOGY | Facility: CLINIC | Age: 59
End: 2024-10-03

## 2024-10-03 NOTE — TELEPHONE ENCOUNTER
Provider: DR. GRIMALDO    Caller: LEANN    Relationship to Patient: NONE    Pharmacy: [facility]    Phone Number: [phone]    Reason for Call: FOLLOWING UP SERGEI/NAYELY (PRIOR MSG 09/18-09/19) REGARDING ADMINISTRATION EXPENSES TO PT, AS [facility] WILL PROVIDE ULTOMIRIS FREE OF CHARGE.      PLEASE REVIEW AND ADVISE

## 2024-10-08 ENCOUNTER — TELEPHONE (OUTPATIENT)
Dept: NEUROLOGY | Facility: CLINIC | Age: 59
End: 2024-10-08
Payer: MEDICAID

## 2024-10-08 NOTE — TELEPHONE ENCOUNTER
Caller: LEANN MAI/[facility]AV[facility]    Best call back number: [phone]     What is the best time to reach you: ANYTIME - DIRECT LINE WITH SECURE V,M    Who are you requesting to speak with (clinical staff, provider,  specific staff member): CLINICAL    What was the call regarding: LEANN STATED THAT THE PATIENT HAS BEEN DROPPED BY MEDICAID AND NO LONGER HAS COVERAGE. SHE SAID THEY WILL GIVE THE MEDICATION FOR FREE BUT THEY ARE LOOKING FOR A PROVIDER WHO WILL DO THE INFUSION OF THE MEDS FOR NO CHARGE. MED IS ULTOMIRIS.     PLEASE ADVISE  THANK YOU

## 2024-10-08 NOTE — TELEPHONE ENCOUNTER
Spoke with Rehana. To let her know that anywhere we would send him would have infusion charges but I am discussing with [facility] to get an estimated price for a patient that would be self pay. Informed her I'd like her know.

## 2024-10-31 ENCOUNTER — TELEPHONE (OUTPATIENT)
Dept: NEUROLOGY | Facility: CLINIC | Age: 59
End: 2024-10-31
Payer: MEDICAID

## 2024-10-31 NOTE — TELEPHONE ENCOUNTER
Patient called back. I asked him about insurance. He stated he wont have insurance until February. He makes too much money to qualify for Medicaid per patient. Asked him how he was feeling, he stated he's doing well minus the heart attack he had about 2 months ago. He will be at follow up appointment 11/4 so that we can discuss medication options for patient. He verbalized understanding.

## 2024-10-31 NOTE — TELEPHONE ENCOUNTER
Attempted to call patient to discuss insurance issues and possible other medication options. Not able to leave voicemail will call again tomorrow.

## 2024-11-04 ENCOUNTER — OFFICE VISIT (OUTPATIENT)
Dept: NEUROLOGY | Facility: CLINIC | Age: 59
End: 2024-11-04
Payer: MEDICAID

## 2024-11-04 ENCOUNTER — LAB (OUTPATIENT)
Dept: LAB | Facility: HOSPITAL | Age: 59
End: 2024-11-04
Payer: MEDICAID

## 2024-11-04 VITALS
DIASTOLIC BLOOD PRESSURE: 78 MMHG | OXYGEN SATURATION: 97 % | SYSTOLIC BLOOD PRESSURE: 136 MMHG | BODY MASS INDEX: 37.79 KG/M2 | HEIGHT: 72 IN | WEIGHT: 279 LBS | HEART RATE: 69 BPM

## 2024-11-04 DIAGNOSIS — G70.00 MYASTHENIA GRAVIS WITHOUT EXACERBATION: Primary | Chronic | ICD-10-CM

## 2024-11-04 DIAGNOSIS — G70.00 MYASTHENIA GRAVIS WITHOUT EXACERBATION: ICD-10-CM

## 2024-11-04 PROCEDURE — 99214 OFFICE O/P EST MOD 30 MIN: CPT | Performed by: PSYCHIATRY & NEUROLOGY

## 2024-11-04 PROCEDURE — 36415 COLL VENOUS BLD VENIPUNCTURE: CPT

## 2024-11-04 PROCEDURE — 82607 VITAMIN B-12: CPT

## 2024-11-04 PROCEDURE — 82550 ASSAY OF CK (CPK): CPT

## 2024-11-04 PROCEDURE — 84443 ASSAY THYROID STIM HORMONE: CPT

## 2024-11-04 PROCEDURE — 86041 ACETYLCHOLN RCPTR BNDNG ANTB: CPT

## 2024-11-04 PROCEDURE — 85025 COMPLETE CBC W/AUTO DIFF WBC: CPT

## 2024-11-04 PROCEDURE — 80053 COMPREHEN METABOLIC PANEL: CPT

## 2024-11-04 PROCEDURE — 86042 ACETYLCHOLN RCPTR BLCKG ANTB: CPT

## 2024-11-04 PROCEDURE — 82746 ASSAY OF FOLIC ACID SERUM: CPT

## 2024-11-04 PROCEDURE — 86043 ACETYLCHOLN RCPTR MODLG ANTB: CPT

## 2024-11-04 RX ORDER — AZATHIOPRINE 50 MG/1
100 TABLET ORAL 2 TIMES DAILY
Qty: 360 TABLET | Refills: 2 | Status: SHIPPED | OUTPATIENT
Start: 2024-11-04

## 2024-11-04 RX ORDER — PREDNISONE 2.5 MG/1
7.5 TABLET ORAL DAILY
Qty: 270 TABLET | Refills: 3 | Status: SHIPPED | OUTPATIENT
Start: 2024-11-04 | End: 2025-11-04

## 2024-11-04 NOTE — PROGRESS NOTES
"Chief Complaint  Myasthenia gravis without exacerbation (Needs refills)    Subjective        Jaiden Burdick Jr. presents to [facility] NEUROLOGY  History of Present Illness    59 y.o. male returns in follow up.  Last visit on 2/13/24 continued Ultomaris, AZA,  Prednisone 10 mg daily, ordered labs.       11/1/23  ALT 20, AST 27 abs lymph 1050     Last Ultomaris infusion 4/24/24    No new or worsening sx.       7/1/24 MI 2 months with PTCA at [facility]    Can walk 100 yards prior to SOB.        Shoulder and arm pain improved after PTCA     Problem history:     Admitted [facility] 9/13/22 - 9/14/22 for GI bleed.  Hgb 6.1.  Tx 2 U PRBC.  Large 15 mm ulcer prepyloric stomach.       AChR Ab binding 7.4; Blocking 44; modulating 37  MUSK negative     SOB with exertion.  Trouble carrying objects.       Using arms to get out of chair.      Extra effort to use arms     Rare choking.             Objective   Vital Signs:  /78   Pulse 69   Ht 182.9 cm (72\")   Wt 127 kg (279 lb)   SpO2 97%   BMI 37.84 kg/m²   Estimated body mass index is 37.84 kg/m² as calculated from the following:    Height as of this encounter: 182.9 cm (72\").    Weight as of this encounter: 127 kg (279 lb).        Neurological Exam  Mental Status  Awake, alert and oriented to person, place and time. Oriented to person, place and time. Speech is normal. Language is fluent with no aphasia. Attention and concentration are normal. Fund of knowledge is appropriate for level of education.    Cranial Nerves  CN III, IV, VI: Extraocular movements intact bilaterally. Pupils equal round and reactive to light bilaterally.  CN V: Facial sensation is normal.  CN VII: Full and symmetric facial movement.  CN IX, X: Palate elevates symmetrically  CN XI: Shoulder shrug strength is normal.  CN XII: Tongue midline without atrophy or fasciculations.    Motor   Strength is 5/5 throughout all four extremities.    Sensory  Sensation is intact to light touch, " pinprick, vibration and proprioception in all four extremities.    Reflexes  Deep tendon reflexes are 2+ and symmetric in all four extremities.    Coordination    Finger-to-nose, rapid alternating movements and heel-to-shin normal bilaterally without dysmetria.    Gait  Normal casual, toe, heel and tandem gait.       Physical Exam  Eyes:      Extraocular Movements: Extraocular movements intact.      Pupils: Pupils are equal, round, and reactive to light.   Neurological:      Motor: Motor strength is normal.     Coordination: Coordination is intact.      Deep Tendon Reflexes: Reflexes are normal and symmetric.   Psychiatric:         Speech: Speech normal.        Result Review :                Assessment and Plan   Diagnoses and all orders for this visit:    1. Myasthenia gravis without exacerbation (Primary)  Assessment & Plan:  Decrease prednisone 2.5 mg every 2 weeks till off     Continue AZA    Stopped Ultomaris    Orders:  -     CBC & Differential; Future  -     Comprehensive Metabolic Panel; Future  -     Vitamin B12 & Folate; Future  -     TSH; Future  -     CK; Future  -     Acetylcholine Receptor Antibody Panel; Future    Other orders  -     azaTHIOprine (IMURAN) 50 MG tablet; Take 2 tablets by mouth 2 (Two) Times a Day.  Dispense: 360 tablet; Refill: 2  -     predniSONE (DELTASONE) 2.5 MG tablet; Take 3 tablets by mouth Daily.  Dispense: 270 tablet; Refill: 3             Follow Up   No follow-ups on file.  Patient was given instructions and counseling regarding his condition or for health maintenance advice. Please see specific information pulled into the AVS if appropriate.

## 2024-11-05 LAB
ALBUMIN SERPL-MCNC: 4.1 G/DL (ref 3.5–5.2)
ALBUMIN/GLOB SERPL: 1.5 G/DL
ALP SERPL-CCNC: 87 U/L (ref 39–117)
ALT SERPL W P-5'-P-CCNC: 32 U/L (ref 1–41)
ANION GAP SERPL CALCULATED.3IONS-SCNC: 12.1 MMOL/L (ref 5–15)
AST SERPL-CCNC: 38 U/L (ref 1–40)
BASOPHILS # BLD AUTO: 0.02 10*3/MM3 (ref 0–0.2)
BASOPHILS NFR BLD AUTO: 0.3 % (ref 0–1.5)
BILIRUB SERPL-MCNC: 0.7 MG/DL (ref 0–1.2)
BUN SERPL-MCNC: 24 MG/DL (ref 8–23)
BUN/CREAT SERPL: 24 (ref 7–25)
CALCIUM SPEC-SCNC: 9.1 MG/DL (ref 8.6–10.5)
CHLORIDE SERPL-SCNC: 106 MMOL/L (ref 98–107)
CK SERPL-CCNC: 177 U/L (ref 20–200)
CO2 SERPL-SCNC: 22.9 MMOL/L (ref 22–29)
CREAT SERPL-MCNC: 1 MG/DL (ref 0.76–1.27)
DEPRECATED RDW RBC AUTO: 48.1 FL (ref 37–54)
EGFRCR SERPLBLD CKD-EPI 2021: 86.2 ML/MIN/1.73
EOSINOPHIL # BLD AUTO: 0 10*3/MM3 (ref 0–0.4)
EOSINOPHIL NFR BLD AUTO: 0 % (ref 0.3–6.2)
ERYTHROCYTE [DISTWIDTH] IN BLOOD BY AUTOMATED COUNT: 13.9 % (ref 12.3–15.4)
FOLATE SERPL-MCNC: 7.62 NG/ML (ref 4.78–24.2)
GLOBULIN UR ELPH-MCNC: 2.7 GM/DL
GLUCOSE SERPL-MCNC: 107 MG/DL (ref 65–99)
HCT VFR BLD AUTO: 43.7 % (ref 37.5–51)
HGB BLD-MCNC: 14.6 G/DL (ref 13–17.7)
IMM GRANULOCYTES # BLD AUTO: 0.01 10*3/MM3 (ref 0–0.05)
IMM GRANULOCYTES NFR BLD AUTO: 0.2 % (ref 0–0.5)
LYMPHOCYTES # BLD AUTO: 0.53 10*3/MM3 (ref 0.7–3.1)
LYMPHOCYTES NFR BLD AUTO: 8.5 % (ref 19.6–45.3)
MCH RBC QN AUTO: 31.6 PG (ref 26.6–33)
MCHC RBC AUTO-ENTMCNC: 33.4 G/DL (ref 31.5–35.7)
MCV RBC AUTO: 94.6 FL (ref 79–97)
MONOCYTES # BLD AUTO: 0.4 10*3/MM3 (ref 0.1–0.9)
MONOCYTES NFR BLD AUTO: 6.4 % (ref 5–12)
NEUTROPHILS NFR BLD AUTO: 5.25 10*3/MM3 (ref 1.7–7)
NEUTROPHILS NFR BLD AUTO: 84.6 % (ref 42.7–76)
NRBC BLD AUTO-RTO: 0 /100 WBC (ref 0–0.2)
PLATELET # BLD AUTO: 294 10*3/MM3 (ref 140–450)
PMV BLD AUTO: 9 FL (ref 6–12)
POTASSIUM SERPL-SCNC: 4.9 MMOL/L (ref 3.5–5.2)
PROT SERPL-MCNC: 6.8 G/DL (ref 6–8.5)
RBC # BLD AUTO: 4.62 10*6/MM3 (ref 4.14–5.8)
SODIUM SERPL-SCNC: 141 MMOL/L (ref 136–145)
TSH SERPL DL<=0.05 MIU/L-ACNC: 2.47 UIU/ML (ref 0.27–4.2)
VIT B12 BLD-MCNC: 553 PG/ML (ref 211–946)
WBC NRBC COR # BLD AUTO: 6.21 10*3/MM3 (ref 3.4–10.8)

## 2024-11-10 LAB
ACHR BIND AB SER-SCNC: 6.02 NMOL/L (ref 0–0.24)
ACHR BLOCK AB SER-ACNC: 30 % (ref 0–25)
ACHR MOD AB SER QL FC: 53 % (ref 0–45)

## 2025-01-24 NOTE — PROGRESS NOTES
"Chief Complaint    MG    Subjective          Jaiden Burdick presents to [facility] NEUROLOGY     History of Present Illness    56 y.o. male returns in follow up.  Last visit on 6/3/21continued prednisone 40 mg daily, AZA, mestinon, ordered labs.      AChR Ab binding 7.4; Blocking 44; modulating 37  MUSK negative     Admitted 9/1/21 - 9/8/21 Covid PNA at [place].  Denies worsening of MG Sx.      MG ADL 7      Regaining strength.       5/6/21 CBC.CMP - NCS      Problem history:     Eyelids feel heavy.  Intermittent diplopia.       3 - 4 months ago developed L ptosis, horizontal diplopia.  Worse in the evenings, driving and watching TV.      Mild trouble chewing/swallowing.       Arms are weak.  Trouble arising from a chair.     Feels 40% improvement since starting on medications.        CT Chest normal      Reviewed medical records:     C/O HA, vision loss, diplopia     Dx with MG by elevated Ach R ab blocking 45, binding      Started on mestinon with GI upset.      MRI Brain, 11/2/2020, mild SVDz  CTA Head, 11/2/2020, mild irregularity of vessels.           Objective   Vital Signs:   /78   Pulse 88   Temp 97.1 °F (36.2 °C)   Ht 188 cm (74.02\")   Wt 129 kg (284 lb)   SpO2 98%   BMI 36.45 kg/m²     Physical Exam  Eyes:      Extraocular Movements: EOM normal.      Pupils: Pupils are equal, round, and reactive to light.   Neurological:      Mental Status: He is oriented to person, place, and time.      Gait: Gait is intact.      Deep Tendon Reflexes: Strength normal.   Psychiatric:         Speech: Speech normal.          Neurologic Exam     Mental Status   Oriented to person, place, and time.   Speech: speech is normal   Level of consciousness: alert  Knowledge: good and consistent with education.   Normal comprehension.     Cranial Nerves   Cranial nerves II through XII intact.     CN II   Visual fields full to confrontation.   Visual acuity: normal  Right visual field deficit: none  Left " PRE-SEDATION ASSESSMENT    CONSENT  Risks, benefits, and alternatives have been discussed with the patient/patient representative, and patient/patient representative agrees to proceed: Yes    MEDICAL HISTORY       PHYSICAL EXAM  History and Physical Reviewed: H&P completed today  Airway Risk History: No previous complications  Airway Anatomy : Class II  Heart : Normal  Lungs : Normal  LOC/Mental Status : Normal    OTHER FINDINGS  Reviewed current medications and allergies: Yes  Pertinent lab/diagnostic test reviewed: Yes    SEDATION RISK ASSESSMENT  Risk Status ASA: Class III - Severe systemic disease, limits activity, is not incapacitated  Plan for Sedation: Moderate Sedation  Indications for Procedure/Pre-Procedure Diagnosis and Planned Procedure: CAD  CA ++  coronary arterial calcification           ANGINA     AS    DYSPNEA     NO  SYNCOPE      NO  EDEMA     STATES  ADHERENCE  TO     MEDICATIONS       DIET     EXERCISE           EKG  12/20/2024  Ventricular rate 85 bpm normal EKG  EKG Monitoring: Yes    NARRATIVE FINDINGS  Narrative Findings: CAD  CA ++  coronary arterial calcification           ANGINA     AS    DYSPNEA     NO  SYNCOPE      NO  EDEMA     STATES  ADHERENCE  TO     MEDICATIONS       DIET     EXERCISE           EKG  12/20/2024  Ventricular rate 85 bpm normal EKG   visual field deficit: none     CN III, IV, VI   Pupils are equal, round, and reactive to light.  Extraocular motions are normal.   Nystagmus: none   Diplopia: none  Ophthalmoparesis: none  Upgaze: normal  Downgaze: normal  Conjugate gaze: present    CN V   Facial sensation intact.   Right corneal reflex: normal  Left corneal reflex: normal    CN VII   Right facial weakness: none  Left facial weakness: none    CN VIII   Hearing: intact    CN IX, X   Palate: symmetric  Right gag reflex: normal  Left gag reflex: normal    CN XI   Right sternocleidomastoid strength: normal  Left sternocleidomastoid strength: normal    CN XII   Tongue: not atrophic  Fasciculations: absent  Tongue deviation: none    Motor Exam   Muscle bulk: normal  Overall muscle tone: normal    Strength   Strength 5/5 throughout.     Sensory Exam   Light touch normal.     Gait, Coordination, and Reflexes     Gait  Gait: normal    Tremor   Resting tremor: absent  Intention tremor: absent  Action tremor: absent    Reflexes   Reflexes 2+ except as noted.      Result Review :   The following data was reviewed by: Franco Garza MD on 11/23/2021:  Common labs    Common Labsle 4/6/21 4/6/21 5/6/21 5/6/21 6/3/21 6/3/21    1048 1048 1600 1600 1604 1604   Glucose  86  84  113 (A)   BUN  25 (A)  32 (A)  24 (A)   Creatinine  0.92  1.11  1.02   eGFR Non African Am  85  69  76   Sodium  137  141  137   Potassium  4.4  4.6  4.6   Chloride  104  104  102   Calcium  8.8  9.1  9.5   Albumin  4.40  4.10  4.60   Total Bilirubin  0.5  0.7  0.5   Alkaline Phosphatase  146 (A)  75  63   AST (SGOT)  34  22  17   ALT (SGPT)  45 (A)  28  23   WBC 4.56  10.91 (A)  7.26    Hemoglobin 14.1  13.6  15.1    Hematocrit 42.5  41.8  45.6    Platelets 279  331  310    (A) Abnormal value       Comments are available for some flowsheets but are not being displayed.                     Assessment and Plan    Diagnoses and all orders for this visit:    1. Myasthenia gravis without  exacerbation (HCC) (Primary)  Assessment & Plan:  MG ADL 7     Decrease prednisone 20 mg daily    Continue  mg BID     Orders:  -     CBC & Differential; Future  -     Comprehensive Metabolic Panel; Future    2. Gastroesophageal reflux disease without esophagitis  Assessment & Plan:  Continue Protonix       Other orders  -     predniSONE (DELTASONE) 20 MG tablet; Take 1 tablet by mouth Daily.  Dispense: 60 tablet; Refill: 11      Follow Up   No follow-ups on file.  Patient was given instructions and counseling regarding his condition or for health maintenance advice. Please see specific information pulled into the AVS if appropriate.

## 2025-01-29 ENCOUNTER — TELEPHONE (OUTPATIENT)
Dept: NEUROLOGY | Facility: CLINIC | Age: 60
End: 2025-01-29

## 2025-01-29 NOTE — TELEPHONE ENCOUNTER
Provider: DILLAN    Caller: Jaiden Burdick Jr.    Relationship to Patient: Self    Phone Number: [phone]     Reason for Call: PATIENT IS REQUESTING TO BE STARTED BACK ON HIS PREDNISONE DUE TO BEING IN PAIN. PLEASE CONTACT PATIENT.     PLEASE REVIEW

## 2025-01-30 RX ORDER — PREDNISONE 10 MG/1
10 TABLET ORAL DAILY
Qty: 90 TABLET | Refills: 3 | Status: SHIPPED | OUTPATIENT
Start: 2025-01-30 | End: 2026-01-30

## 2025-01-30 NOTE — TELEPHONE ENCOUNTER
Notified patient that Dr. Garza called in prednisone. He verbalized understanding and appreciation.

## 2025-02-18 ENCOUNTER — TELEPHONE (OUTPATIENT)
Dept: NEUROLOGY | Facility: CLINIC | Age: 60
End: 2025-02-18

## 2025-04-08 ENCOUNTER — DOCUMENTATION (OUTPATIENT)
Dept: NEUROLOGY | Facility: CLINIC | Age: 60
End: 2025-04-08
Payer: MEDICARE

## 2025-05-05 ENCOUNTER — OFFICE VISIT (OUTPATIENT)
Dept: NEUROLOGY | Facility: CLINIC | Age: 60
End: 2025-05-05
Payer: MEDICARE

## 2025-05-05 ENCOUNTER — LAB (OUTPATIENT)
Dept: LAB | Facility: HOSPITAL | Age: 60
End: 2025-05-05
Payer: MEDICARE

## 2025-05-05 VITALS
HEIGHT: 72 IN | DIASTOLIC BLOOD PRESSURE: 82 MMHG | OXYGEN SATURATION: 97 % | WEIGHT: 287 LBS | BODY MASS INDEX: 38.87 KG/M2 | SYSTOLIC BLOOD PRESSURE: 138 MMHG | HEART RATE: 61 BPM

## 2025-05-05 DIAGNOSIS — G70.00 MYASTHENIA GRAVIS WITHOUT EXACERBATION: Primary | Chronic | ICD-10-CM

## 2025-05-05 DIAGNOSIS — G70.00 MYASTHENIA GRAVIS WITHOUT EXACERBATION: ICD-10-CM

## 2025-05-05 PROCEDURE — 99214 OFFICE O/P EST MOD 30 MIN: CPT | Performed by: PSYCHIATRY & NEUROLOGY

## 2025-05-05 PROCEDURE — 3079F DIAST BP 80-89 MM HG: CPT | Performed by: PSYCHIATRY & NEUROLOGY

## 2025-05-05 PROCEDURE — 3075F SYST BP GE 130 - 139MM HG: CPT | Performed by: PSYCHIATRY & NEUROLOGY

## 2025-05-05 PROCEDURE — 1160F RVW MEDS BY RX/DR IN RCRD: CPT | Performed by: PSYCHIATRY & NEUROLOGY

## 2025-05-05 PROCEDURE — 80053 COMPREHEN METABOLIC PANEL: CPT

## 2025-05-05 PROCEDURE — 85025 COMPLETE CBC W/AUTO DIFF WBC: CPT

## 2025-05-05 PROCEDURE — 1159F MED LIST DOCD IN RCRD: CPT | Performed by: PSYCHIATRY & NEUROLOGY

## 2025-05-05 PROCEDURE — 36415 COLL VENOUS BLD VENIPUNCTURE: CPT

## 2025-05-05 RX ORDER — CARVEDILOL 6.25 MG/1
TABLET ORAL
COMMUNITY
Start: 2025-04-14

## 2025-05-05 RX ORDER — PREDNISONE 10 MG/1
10 TABLET ORAL DAILY
Qty: 90 TABLET | Refills: 3 | Status: SHIPPED | OUTPATIENT
Start: 2025-05-05 | End: 2026-05-05

## 2025-05-05 RX ORDER — AZATHIOPRINE 50 MG/1
100 TABLET ORAL 2 TIMES DAILY
Qty: 360 TABLET | Refills: 2 | Status: SHIPPED | OUTPATIENT
Start: 2025-05-05

## 2025-05-05 RX ORDER — FAMOTIDINE 40 MG/1
TABLET, FILM COATED ORAL
COMMUNITY
Start: 2025-04-14

## 2025-05-06 LAB
ALBUMIN SERPL-MCNC: 4.1 G/DL (ref 3.5–5.2)
ALBUMIN/GLOB SERPL: 1.3 G/DL
ALP SERPL-CCNC: 87 U/L (ref 39–117)
ALT SERPL W P-5'-P-CCNC: 31 U/L (ref 1–41)
ANION GAP SERPL CALCULATED.3IONS-SCNC: 11.3 MMOL/L (ref 5–15)
AST SERPL-CCNC: 34 U/L (ref 1–40)
BASOPHILS # BLD AUTO: 0.02 10*3/MM3 (ref 0–0.2)
BASOPHILS NFR BLD AUTO: 0.3 % (ref 0–1.5)
BILIRUB SERPL-MCNC: 0.7 MG/DL (ref 0–1.2)
BUN SERPL-MCNC: 17 MG/DL (ref 8–23)
BUN/CREAT SERPL: 17.9 (ref 7–25)
CALCIUM SPEC-SCNC: 9.5 MG/DL (ref 8.6–10.5)
CHLORIDE SERPL-SCNC: 102 MMOL/L (ref 98–107)
CO2 SERPL-SCNC: 25.7 MMOL/L (ref 22–29)
CREAT SERPL-MCNC: 0.95 MG/DL (ref 0.76–1.27)
DEPRECATED RDW RBC AUTO: 47.8 FL (ref 37–54)
EGFRCR SERPLBLD CKD-EPI 2021: 91.6 ML/MIN/1.73
EOSINOPHIL # BLD AUTO: 0.02 10*3/MM3 (ref 0–0.4)
EOSINOPHIL NFR BLD AUTO: 0.3 % (ref 0.3–6.2)
ERYTHROCYTE [DISTWIDTH] IN BLOOD BY AUTOMATED COUNT: 13.9 % (ref 12.3–15.4)
GLOBULIN UR ELPH-MCNC: 3.1 GM/DL
GLUCOSE SERPL-MCNC: 92 MG/DL (ref 65–99)
HCT VFR BLD AUTO: 46.8 % (ref 37.5–51)
HGB BLD-MCNC: 15.6 G/DL (ref 13–17.7)
IMM GRANULOCYTES # BLD AUTO: 0.04 10*3/MM3 (ref 0–0.05)
IMM GRANULOCYTES NFR BLD AUTO: 0.5 % (ref 0–0.5)
LYMPHOCYTES # BLD AUTO: 0.82 10*3/MM3 (ref 0.7–3.1)
LYMPHOCYTES NFR BLD AUTO: 10.3 % (ref 19.6–45.3)
MCH RBC QN AUTO: 31.5 PG (ref 26.6–33)
MCHC RBC AUTO-ENTMCNC: 33.3 G/DL (ref 31.5–35.7)
MCV RBC AUTO: 94.4 FL (ref 79–97)
MONOCYTES # BLD AUTO: 0.47 10*3/MM3 (ref 0.1–0.9)
MONOCYTES NFR BLD AUTO: 5.9 % (ref 5–12)
NEUTROPHILS NFR BLD AUTO: 6.56 10*3/MM3 (ref 1.7–7)
NEUTROPHILS NFR BLD AUTO: 82.7 % (ref 42.7–76)
NRBC BLD AUTO-RTO: 0 /100 WBC (ref 0–0.2)
PLATELET # BLD AUTO: 281 10*3/MM3 (ref 140–450)
PMV BLD AUTO: 9.5 FL (ref 6–12)
POTASSIUM SERPL-SCNC: 4.4 MMOL/L (ref 3.5–5.2)
PROT SERPL-MCNC: 7.2 G/DL (ref 6–8.5)
RBC # BLD AUTO: 4.96 10*6/MM3 (ref 4.14–5.8)
SODIUM SERPL-SCNC: 139 MMOL/L (ref 136–145)
WBC NRBC COR # BLD AUTO: 7.93 10*3/MM3 (ref 3.4–10.8)

## 2025-07-03 ENCOUNTER — TELEPHONE (OUTPATIENT)
Dept: NEUROLOGY | Facility: CLINIC | Age: 60
End: 2025-07-03
Payer: MEDICARE

## 2025-07-03 NOTE — TELEPHONE ENCOUNTER
Patient states he wanted to follow up with Yuliya regarding an infusion (could not recall name) they discussed.

## (undated) DEVICE — CONTN GRAD MEAS TRIANG 32OZ BLK

## (undated) DEVICE — KT ORCA ORCAPOD DISP STRL

## (undated) DEVICE — LUBE GEL ENDOGLIDE 1.1OZ

## (undated) DEVICE — THE BITE BLOCK MAXI, LATEX FREE STRAP IS USED TO PROTECT THE ENDOSCOPE INSERTION TUBE FROM BEING BITTEN BY THE PATIENT.

## (undated) DEVICE — SPNG ENDO BEDSIDE TUB ENZYM

## (undated) DEVICE — SOL IRR H2O BTL 1000ML STRL

## (undated) DEVICE — INTRO ACCSR BLNT TP

## (undated) DEVICE — SYR LUERLOK 50ML

## (undated) DEVICE — HYBRID CO2 TUBING/CAP SET FOR OLYMPUS® SCOPES & CO2 SOURCE: Brand: ERBE

## (undated) DEVICE — TUBING, SUCTION, 1/4" X 10', STRAIGHT: Brand: MEDLINE